# Patient Record
Sex: FEMALE | Race: OTHER | NOT HISPANIC OR LATINO
[De-identification: names, ages, dates, MRNs, and addresses within clinical notes are randomized per-mention and may not be internally consistent; named-entity substitution may affect disease eponyms.]

---

## 2017-01-27 ENCOUNTER — MEDICATION RENEWAL (OUTPATIENT)
Age: 27
End: 2017-01-27

## 2017-06-19 ENCOUNTER — RX RENEWAL (OUTPATIENT)
Age: 27
End: 2017-06-19

## 2017-08-15 ENCOUNTER — RX RENEWAL (OUTPATIENT)
Age: 27
End: 2017-08-15

## 2017-08-23 ENCOUNTER — MEDICATION RENEWAL (OUTPATIENT)
Age: 27
End: 2017-08-23

## 2017-09-18 ENCOUNTER — APPOINTMENT (OUTPATIENT)
Dept: NEUROLOGY | Facility: CLINIC | Age: 27
End: 2017-09-18
Payer: COMMERCIAL

## 2017-09-18 VITALS
DIASTOLIC BLOOD PRESSURE: 77 MMHG | SYSTOLIC BLOOD PRESSURE: 106 MMHG | BODY MASS INDEX: 24.77 KG/M2 | WEIGHT: 118 LBS | HEART RATE: 90 BPM | HEIGHT: 58 IN

## 2017-09-18 PROCEDURE — 99214 OFFICE O/P EST MOD 30 MIN: CPT

## 2017-11-15 ENCOUNTER — RX RENEWAL (OUTPATIENT)
Age: 27
End: 2017-11-15

## 2017-11-28 ENCOUNTER — RX RENEWAL (OUTPATIENT)
Age: 27
End: 2017-11-28

## 2018-01-23 ENCOUNTER — APPOINTMENT (OUTPATIENT)
Dept: NEUROLOGY | Facility: CLINIC | Age: 28
End: 2018-01-23

## 2018-05-09 ENCOUNTER — RX RENEWAL (OUTPATIENT)
Age: 28
End: 2018-05-09

## 2018-07-05 ENCOUNTER — RX RENEWAL (OUTPATIENT)
Age: 28
End: 2018-07-05

## 2018-08-07 ENCOUNTER — RX RENEWAL (OUTPATIENT)
Age: 28
End: 2018-08-07

## 2018-08-20 ENCOUNTER — RX RENEWAL (OUTPATIENT)
Age: 28
End: 2018-08-20

## 2018-09-05 ENCOUNTER — RX RENEWAL (OUTPATIENT)
Age: 28
End: 2018-09-05

## 2018-09-20 ENCOUNTER — RX RENEWAL (OUTPATIENT)
Age: 28
End: 2018-09-20

## 2018-10-25 ENCOUNTER — RX RENEWAL (OUTPATIENT)
Age: 28
End: 2018-10-25

## 2018-11-06 ENCOUNTER — APPOINTMENT (OUTPATIENT)
Dept: NEUROLOGY | Facility: CLINIC | Age: 28
End: 2018-11-06
Payer: COMMERCIAL

## 2018-11-06 VITALS
BODY MASS INDEX: 24.98 KG/M2 | WEIGHT: 119 LBS | DIASTOLIC BLOOD PRESSURE: 66 MMHG | SYSTOLIC BLOOD PRESSURE: 109 MMHG | HEIGHT: 58 IN | HEART RATE: 105 BPM

## 2018-11-06 PROCEDURE — 99213 OFFICE O/P EST LOW 20 MIN: CPT

## 2019-04-23 ENCOUNTER — RX RENEWAL (OUTPATIENT)
Age: 29
End: 2019-04-23

## 2019-05-28 ENCOUNTER — APPOINTMENT (OUTPATIENT)
Dept: NEUROLOGY | Facility: CLINIC | Age: 29
End: 2019-05-28
Payer: COMMERCIAL

## 2019-05-28 VITALS
SYSTOLIC BLOOD PRESSURE: 114 MMHG | DIASTOLIC BLOOD PRESSURE: 76 MMHG | WEIGHT: 119 LBS | HEART RATE: 96 BPM | BODY MASS INDEX: 24.98 KG/M2 | HEIGHT: 58 IN

## 2019-05-28 PROCEDURE — 99214 OFFICE O/P EST MOD 30 MIN: CPT

## 2019-05-28 NOTE — HISTORY OF PRESENT ILLNESS
[Left Handed] : left handed [Divalproex (Depakote)] : divalproex (Depakote) [Levetiracetam (Keppra)] : levetiracetam (Keppra) [Oxcarbazepine (Trileptal)] : oxcarbazepine (Trileptal) [Topiramate (Topamax)] : topiramate (Topamax) [FreeTextEntry1] : Mar 24, 1994  [FreeTextEntry2] : GTCs (initially had with fever) and myoclonus; EEG has shown generalized spike-wave discharges [de-identified] : 2004

## 2019-05-28 NOTE — LETTER CLOSING
[Consult] : Thank you very much for allowing me to participate in the care of this patient. If you have any questions, please do not hesitate to contact me [Sincerely,] : Sincerely, [Terry Hendrix MD] : Terry Hendrix MD [Attending Neurologist, Division of EEG and Epilepsy] : Attending Neurologist, Division of EEG and Epilepsy [[facility]] : [facility] [[facility]] : [facility]

## 2019-05-28 NOTE — PHYSICAL EXAM
[General Appearance - Alert] : alert [General Appearance - Well Nourished] : well nourished [General Appearance - Well Developed] : well developed [General Appearance - Well-Appearing] : healthy appearing [FreeTextEntry1] : Patient in happy state and walking around room [Cranial Nerves Optic (II)] : visual acuity intact bilaterally,  visual fields full to confrontation, pupils equal round and reactive to light [Cranial Nerves Oculomotor (III)] : extraocular motion intact [Cranial Nerves Facial (VII)] : face symmetrical [Balance] : balance was intact [2+] : Ankle jerk left 2+ [FreeTextEntry6] : Moves all four extremities spontaneously, unable to excess full strength.  Normal bulk and tone. [FreeTextEntry7] : Unable to assess [Sclera] : the sclera and conjunctiva were normal [PERRL With Normal Accommodation] : pupils were equal in size, round, reactive to light, with normal accommodation [Full Pulse] : the pedal pulses are present [Edema] : there was no peripheral edema [Abnormal Walk] : normal gait [Nail Clubbing] : no clubbing  or cyanosis of the fingernails [Musculoskeletal - Swelling] : no joint swelling seen [Motor Tone] : muscle strength and tone were normal

## 2019-05-28 NOTE — DISCUSSION/SUMMARY
[FreeTextEntry1] : Impression-\par - epilepsy - generalized (myoclonic)\par - severe mental retardation\par - h/o scoliosis\par \par Plan\par - continue syrup Keppra 750 mg bid\par - f/u 6 months and then one year\par  [Well-controlled] : well-controlled [Myoclonic] : myoclonic [Generalized] : generalized  [Idiopathic] : idiopathic

## 2019-12-12 ENCOUNTER — RX RENEWAL (OUTPATIENT)
Age: 29
End: 2019-12-12

## 2020-01-16 ENCOUNTER — RX RENEWAL (OUTPATIENT)
Age: 30
End: 2020-01-16

## 2020-02-14 ENCOUNTER — APPOINTMENT (OUTPATIENT)
Dept: NEUROLOGY | Facility: CLINIC | Age: 30
End: 2020-02-14
Payer: COMMERCIAL

## 2020-02-14 VITALS
HEART RATE: 89 BPM | SYSTOLIC BLOOD PRESSURE: 122 MMHG | WEIGHT: 119 LBS | DIASTOLIC BLOOD PRESSURE: 87 MMHG | HEIGHT: 58 IN | BODY MASS INDEX: 24.98 KG/M2

## 2020-02-14 PROCEDURE — 99214 OFFICE O/P EST MOD 30 MIN: CPT

## 2020-02-14 NOTE — HISTORY OF PRESENT ILLNESS
[Left Handed] : left handed [Divalproex (Depakote)] : divalproex (Depakote) [Levetiracetam (Keppra)] : levetiracetam (Keppra) [Oxcarbazepine (Trileptal)] : oxcarbazepine (Trileptal) [Topiramate (Topamax)] : topiramate (Topamax) [FreeTextEntry1] : Mar 24, 1994  [FreeTextEntry2] : GTCs (initially had with fever) and myoclonus; EEG has shown generalized spike-wave discharges [de-identified] : 2004

## 2020-02-14 NOTE — LETTER CLOSING
[Consult] : Thank you very much for allowing me to participate in the care of this patient. If you have any questions, please do not hesitate to contact me [Terry Hendrix MD] : Terry Hendrix MD [Attending Neurologist, Division of EEG and Epilepsy] : Attending Neurologist, Division of EEG and Epilepsy [Sincerely,] : Sincerely, [[facility]] : [facility] [[facility]] : [facility]

## 2020-02-14 NOTE — PHYSICAL EXAM
[General Appearance - Alert] : alert [General Appearance - Well Nourished] : well nourished [General Appearance - Well Developed] : well developed [General Appearance - Well-Appearing] : healthy appearing [Cranial Nerves Oculomotor (III)] : extraocular motion intact [Cranial Nerves Optic (II)] : visual acuity intact bilaterally,  visual fields full to confrontation, pupils equal round and reactive to light [Cranial Nerves Facial (VII)] : face symmetrical [Balance] : balance was intact [2+] : Ankle jerk left 2+ [PERRL With Normal Accommodation] : pupils were equal in size, round, reactive to light, with normal accommodation [Sclera] : the sclera and conjunctiva were normal [Edema] : there was no peripheral edema [Full Pulse] : the pedal pulses are present [Abnormal Walk] : normal gait [Musculoskeletal - Swelling] : no joint swelling seen [Nail Clubbing] : no clubbing  or cyanosis of the fingernails [Motor Tone] : muscle strength and tone were normal [FreeTextEntry1] : Patient in happy state and walking around room [FreeTextEntry7] : Unable to assess [FreeTextEntry6] : Moves all four extremities spontaneously, unable to excess full strength.  Normal bulk and tone.

## 2020-02-14 NOTE — DISCUSSION/SUMMARY
[Well-controlled] : well-controlled [Myoclonic] : myoclonic [Idiopathic] : idiopathic  [Generalized] : generalized  [FreeTextEntry1] : Impression-\par - epilepsy - generalized (myoclonic)\par - severe mental retardation\par - h/o scoliosis\par \par Plan\par - continue syrup Keppra 750 mg bid\par - f/u 6 months and then one year\par \par Patient seen 25 min face to face with >50% in counseling and discussion.

## 2021-02-02 ENCOUNTER — APPOINTMENT (OUTPATIENT)
Dept: NEUROLOGY | Facility: CLINIC | Age: 31
End: 2021-02-02
Payer: COMMERCIAL

## 2021-02-02 VITALS
DIASTOLIC BLOOD PRESSURE: 81 MMHG | BODY MASS INDEX: 24.98 KG/M2 | HEIGHT: 58 IN | HEART RATE: 83 BPM | SYSTOLIC BLOOD PRESSURE: 116 MMHG | WEIGHT: 119 LBS

## 2021-02-02 PROCEDURE — 99072 ADDL SUPL MATRL&STAF TM PHE: CPT

## 2021-02-02 PROCEDURE — 99213 OFFICE O/P EST LOW 20 MIN: CPT

## 2021-02-02 NOTE — HISTORY OF PRESENT ILLNESS
[Left Handed] : left handed [Divalproex (Depakote)] : divalproex (Depakote) [Levetiracetam (Keppra)] : levetiracetam (Keppra) [Oxcarbazepine (Trileptal)] : oxcarbazepine (Trileptal) [Topiramate (Topamax)] : topiramate (Topamax) [FreeTextEntry1] : Mar 24, 1994  [FreeTextEntry2] : GTCs (initially had with fever) and myoclonus; EEG has shown generalized spike-wave discharges [de-identified] : 2004

## 2021-02-02 NOTE — DISCUSSION/SUMMARY
[FreeTextEntry1] : Impression-\par - epilepsy - generalized (myoclonic)\par - severe mental retardation\par - h/o scoliosis\par \par Plan\par - continue syrup Keppra 750 mg bid\par - f/u 6 months and then one year\par \par Total time 22 minutes. [Well-controlled] : well-controlled [Myoclonic] : myoclonic [Generalized] : generalized  [Idiopathic] : idiopathic

## 2021-08-20 ENCOUNTER — RX RENEWAL (OUTPATIENT)
Age: 31
End: 2021-08-20

## 2021-10-13 ENCOUNTER — RX RENEWAL (OUTPATIENT)
Age: 31
End: 2021-10-13

## 2022-02-07 ENCOUNTER — NON-APPOINTMENT (OUTPATIENT)
Age: 32
End: 2022-02-07

## 2022-02-08 ENCOUNTER — APPOINTMENT (OUTPATIENT)
Dept: NEUROLOGY | Facility: CLINIC | Age: 32
End: 2022-02-08
Payer: COMMERCIAL

## 2022-02-08 PROCEDURE — 99213 OFFICE O/P EST LOW 20 MIN: CPT

## 2022-02-13 NOTE — DISCUSSION/SUMMARY
[Well-controlled] : well-controlled [Myoclonic] : myoclonic [Generalized] : generalized  [Idiopathic] : idiopathic  [FreeTextEntry1] : Impression-\par - epilepsy - generalized (myoclonic)\par - severe mental retardation\par - h/o scoliosis\par \par Plan\par - continue  Keppra sol'n 750 mg bid\par - annual lab work\par - follow up in 6  months  call if any seizures\par \par Total time 22 minutes.

## 2022-02-13 NOTE — PHYSICAL EXAM
[General Appearance - Alert] : alert [General Appearance - Well Nourished] : well nourished [General Appearance - Well Developed] : well developed [General Appearance - Well-Appearing] : healthy appearing [Cranial Nerves Optic (II)] : visual acuity intact bilaterally,  visual fields full to confrontation, pupils equal round and reactive to light [Cranial Nerves Oculomotor (III)] : extraocular motion intact [Cranial Nerves Facial (VII)] : face symmetrical [Balance] : balance was intact [2+] : Ankle jerk left 2+ [Sclera] : the sclera and conjunctiva were normal [PERRL With Normal Accommodation] : pupils were equal in size, round, reactive to light, with normal accommodation [Full Pulse] : the pedal pulses are present [Edema] : there was no peripheral edema [Abnormal Walk] : normal gait [Nail Clubbing] : no clubbing  or cyanosis of the fingernails [Musculoskeletal - Swelling] : no joint swelling seen [Motor Tone] : muscle strength and tone were normal [FreeTextEntry1] : Patient in happy state and walking around room [FreeTextEntry6] : Moves all four extremities spontaneously, unable to excess full strength.  Normal bulk and tone. [FreeTextEntry7] : Unable to assess

## 2022-11-05 ENCOUNTER — INPATIENT (INPATIENT)
Facility: HOSPITAL | Age: 32
LOS: 0 days | Discharge: HOME HEALTH | DRG: 179 | End: 2022-11-06
Attending: STUDENT IN AN ORGANIZED HEALTH CARE EDUCATION/TRAINING PROGRAM | Admitting: STUDENT IN AN ORGANIZED HEALTH CARE EDUCATION/TRAINING PROGRAM
Payer: COMMERCIAL

## 2022-11-05 VITALS
RESPIRATION RATE: 24 BRPM | OXYGEN SATURATION: 98 % | HEIGHT: 59 IN | SYSTOLIC BLOOD PRESSURE: 160 MMHG | DIASTOLIC BLOOD PRESSURE: 107 MMHG | WEIGHT: 117.07 LBS | HEART RATE: 69 BPM

## 2022-11-05 LAB
ALBUMIN SERPL ELPH-MCNC: 4.4 G/DL — SIGNIFICANT CHANGE UP (ref 3.3–5)
ALP SERPL-CCNC: 77 U/L — SIGNIFICANT CHANGE UP (ref 40–120)
ALT FLD-CCNC: 31 U/L — SIGNIFICANT CHANGE UP (ref 10–45)
ANION GAP SERPL CALC-SCNC: 16 MMOL/L — SIGNIFICANT CHANGE UP (ref 5–17)
AST SERPL-CCNC: 42 U/L — HIGH (ref 10–40)
B PERT DNA SPEC QL NAA+PROBE: SIGNIFICANT CHANGE UP
BASE EXCESS BLDV CALC-SCNC: 1.2 MMOL/L — SIGNIFICANT CHANGE UP (ref -2–3)
BASOPHILS # BLD AUTO: 0.01 K/UL — SIGNIFICANT CHANGE UP (ref 0–0.2)
BASOPHILS NFR BLD AUTO: 0.1 % — SIGNIFICANT CHANGE UP (ref 0–2)
BILIRUB SERPL-MCNC: 0.3 MG/DL — SIGNIFICANT CHANGE UP (ref 0.2–1.2)
BUN SERPL-MCNC: 14 MG/DL — SIGNIFICANT CHANGE UP (ref 7–23)
C PNEUM DNA SPEC QL NAA+PROBE: SIGNIFICANT CHANGE UP
CA-I SERPL-SCNC: 1.17 MMOL/L — SIGNIFICANT CHANGE UP (ref 1.15–1.33)
CALCIUM SERPL-MCNC: 9.5 MG/DL — SIGNIFICANT CHANGE UP (ref 8.4–10.5)
CHLORIDE BLDV-SCNC: 103 MMOL/L — SIGNIFICANT CHANGE UP (ref 96–108)
CHLORIDE SERPL-SCNC: 102 MMOL/L — SIGNIFICANT CHANGE UP (ref 96–108)
CO2 BLDV-SCNC: 29 MMOL/L — HIGH (ref 22–26)
CO2 SERPL-SCNC: 21 MMOL/L — LOW (ref 22–31)
CREAT SERPL-MCNC: 0.4 MG/DL — LOW (ref 0.5–1.3)
EGFR: 134 ML/MIN/1.73M2 — SIGNIFICANT CHANGE UP
EOSINOPHIL # BLD AUTO: 0 K/UL — SIGNIFICANT CHANGE UP (ref 0–0.5)
EOSINOPHIL NFR BLD AUTO: 0 % — SIGNIFICANT CHANGE UP (ref 0–6)
FLUAV H1 2009 PAND RNA SPEC QL NAA+PROBE: SIGNIFICANT CHANGE UP
FLUAV H1 RNA SPEC QL NAA+PROBE: SIGNIFICANT CHANGE UP
FLUAV H3 RNA SPEC QL NAA+PROBE: SIGNIFICANT CHANGE UP
FLUAV SUBTYP SPEC NAA+PROBE: SIGNIFICANT CHANGE UP
FLUBV RNA SPEC QL NAA+PROBE: SIGNIFICANT CHANGE UP
GAS PNL BLDV: 136 MMOL/L — SIGNIFICANT CHANGE UP (ref 136–145)
GAS PNL BLDV: SIGNIFICANT CHANGE UP
GAS PNL BLDV: SIGNIFICANT CHANGE UP
GLUCOSE BLDV-MCNC: 96 MG/DL — SIGNIFICANT CHANGE UP (ref 70–99)
GLUCOSE SERPL-MCNC: 99 MG/DL — SIGNIFICANT CHANGE UP (ref 70–99)
HADV DNA SPEC QL NAA+PROBE: SIGNIFICANT CHANGE UP
HCG SERPL-ACNC: <2 MIU/ML — SIGNIFICANT CHANGE UP
HCO3 BLDV-SCNC: 27 MMOL/L — SIGNIFICANT CHANGE UP (ref 22–29)
HCOV PNL SPEC NAA+PROBE: SIGNIFICANT CHANGE UP
HCT VFR BLD CALC: 44.2 % — SIGNIFICANT CHANGE UP (ref 34.5–45)
HCT VFR BLDA CALC: 45 % — SIGNIFICANT CHANGE UP (ref 34.5–46.5)
HGB BLD CALC-MCNC: 14.9 G/DL — SIGNIFICANT CHANGE UP (ref 11.7–16.1)
HGB BLD-MCNC: 14.5 G/DL — SIGNIFICANT CHANGE UP (ref 11.5–15.5)
HMPV RNA SPEC QL NAA+PROBE: SIGNIFICANT CHANGE UP
HPIV1 RNA SPEC QL NAA+PROBE: SIGNIFICANT CHANGE UP
HPIV2 RNA SPEC QL NAA+PROBE: SIGNIFICANT CHANGE UP
HPIV3 RNA SPEC QL NAA+PROBE: SIGNIFICANT CHANGE UP
HPIV4 RNA SPEC QL NAA+PROBE: SIGNIFICANT CHANGE UP
IMM GRANULOCYTES NFR BLD AUTO: 0.4 % — SIGNIFICANT CHANGE UP (ref 0–0.9)
LACTATE BLDV-MCNC: 2.4 MMOL/L — HIGH (ref 0.5–2)
LIDOCAIN IGE QN: 23 U/L — SIGNIFICANT CHANGE UP (ref 7–60)
LYMPHOCYTES # BLD AUTO: 1.14 K/UL — SIGNIFICANT CHANGE UP (ref 1–3.3)
LYMPHOCYTES # BLD AUTO: 15.4 % — SIGNIFICANT CHANGE UP (ref 13–44)
MAGNESIUM SERPL-MCNC: 2.3 MG/DL — SIGNIFICANT CHANGE UP (ref 1.6–2.6)
MCHC RBC-ENTMCNC: 30.1 PG — SIGNIFICANT CHANGE UP (ref 27–34)
MCHC RBC-ENTMCNC: 32.8 GM/DL — SIGNIFICANT CHANGE UP (ref 32–36)
MCV RBC AUTO: 91.7 FL — SIGNIFICANT CHANGE UP (ref 80–100)
MONOCYTES # BLD AUTO: 0.51 K/UL — SIGNIFICANT CHANGE UP (ref 0–0.9)
MONOCYTES NFR BLD AUTO: 6.9 % — SIGNIFICANT CHANGE UP (ref 2–14)
NEUTROPHILS # BLD AUTO: 5.7 K/UL — SIGNIFICANT CHANGE UP (ref 1.8–7.4)
NEUTROPHILS NFR BLD AUTO: 77.2 % — HIGH (ref 43–77)
NRBC # BLD: 0 /100 WBCS — SIGNIFICANT CHANGE UP (ref 0–0)
PCO2 BLDV: 47 MMHG — HIGH (ref 39–42)
PH BLDV: 7.37 — SIGNIFICANT CHANGE UP (ref 7.32–7.43)
PHOSPHATE SERPL-MCNC: 4.4 MG/DL — SIGNIFICANT CHANGE UP (ref 2.5–4.5)
PLATELET # BLD AUTO: 211 K/UL — SIGNIFICANT CHANGE UP (ref 150–400)
PO2 BLDV: 36 MMHG — SIGNIFICANT CHANGE UP (ref 25–45)
POTASSIUM BLDV-SCNC: 4.5 MMOL/L — SIGNIFICANT CHANGE UP (ref 3.5–5.1)
POTASSIUM SERPL-MCNC: 5.3 MMOL/L — SIGNIFICANT CHANGE UP (ref 3.5–5.3)
POTASSIUM SERPL-SCNC: 5.3 MMOL/L — SIGNIFICANT CHANGE UP (ref 3.5–5.3)
PROT SERPL-MCNC: 8.8 G/DL — HIGH (ref 6–8.3)
RAPID RVP RESULT: DETECTED
RBC # BLD: 4.82 M/UL — SIGNIFICANT CHANGE UP (ref 3.8–5.2)
RBC # FLD: 12.3 % — SIGNIFICANT CHANGE UP (ref 10.3–14.5)
RV+EV RNA SPEC QL NAA+PROBE: SIGNIFICANT CHANGE UP
SAO2 % BLDV: 57 % — LOW (ref 67–88)
SARS-COV-2 RNA SPEC QL NAA+PROBE: DETECTED
SODIUM SERPL-SCNC: 139 MMOL/L — SIGNIFICANT CHANGE UP (ref 135–145)
WBC # BLD: 7.39 K/UL — SIGNIFICANT CHANGE UP (ref 3.8–10.5)
WBC # FLD AUTO: 7.39 K/UL — SIGNIFICANT CHANGE UP (ref 3.8–10.5)

## 2022-11-05 PROCEDURE — 99285 EMERGENCY DEPT VISIT HI MDM: CPT | Mod: GC

## 2022-11-05 PROCEDURE — 74177 CT ABD & PELVIS W/CONTRAST: CPT | Mod: 26,MA

## 2022-11-05 PROCEDURE — 71045 X-RAY EXAM CHEST 1 VIEW: CPT | Mod: 26

## 2022-11-05 PROCEDURE — 93010 ELECTROCARDIOGRAM REPORT: CPT

## 2022-11-05 RX ORDER — ONDANSETRON 8 MG/1
4 TABLET, FILM COATED ORAL ONCE
Refills: 0 | Status: COMPLETED | OUTPATIENT
Start: 2022-11-05 | End: 2022-11-05

## 2022-11-05 RX ORDER — SODIUM CHLORIDE 9 MG/ML
1000 INJECTION INTRAMUSCULAR; INTRAVENOUS; SUBCUTANEOUS ONCE
Refills: 0 | Status: DISCONTINUED | OUTPATIENT
Start: 2022-11-05 | End: 2022-11-05

## 2022-11-05 RX ORDER — METOCLOPRAMIDE HCL 10 MG
10 TABLET ORAL ONCE
Refills: 0 | Status: COMPLETED | OUTPATIENT
Start: 2022-11-05 | End: 2022-11-05

## 2022-11-05 RX ORDER — SODIUM CHLORIDE 9 MG/ML
1000 INJECTION, SOLUTION INTRAVENOUS
Refills: 0 | Status: DISCONTINUED | OUTPATIENT
Start: 2022-11-05 | End: 2022-11-06

## 2022-11-05 RX ORDER — SODIUM CHLORIDE 9 MG/ML
1000 INJECTION, SOLUTION INTRAVENOUS ONCE
Refills: 0 | Status: COMPLETED | OUTPATIENT
Start: 2022-11-05 | End: 2022-11-05

## 2022-11-05 RX ADMIN — ONDANSETRON 4 MILLIGRAM(S): 8 TABLET, FILM COATED ORAL at 19:12

## 2022-11-05 RX ADMIN — SODIUM CHLORIDE 125 MILLILITER(S): 9 INJECTION, SOLUTION INTRAVENOUS at 23:52

## 2022-11-05 RX ADMIN — Medication 10 MILLIGRAM(S): at 23:52

## 2022-11-05 RX ADMIN — SODIUM CHLORIDE 1000 MILLILITER(S): 9 INJECTION, SOLUTION INTRAVENOUS at 19:12

## 2022-11-05 NOTE — ED ADULT TRIAGE NOTE - CHIEF COMPLAINT QUOTE
cough for few days, started on Cefdinir yesterday, abdominal pain and vomiting this morning cough for few days, started on Cefdinir yesterday, abdominal pain and vomiting this morning, Covid positive 5 days ago

## 2022-11-05 NOTE — ED PROVIDER NOTE - NSICDXFAMILYHX_GEN_ALL_CORE_FT
FAMILY HISTORY:  Father  Still living? Yes, Estimated age: Age Unknown  Family history of diabetes mellitus, Age at diagnosis: Age Unknown  Family history of hypertension, Age at diagnosis: Age Unknown    Mother  Still living? Yes, Estimated age: Age Unknown  Family history of asthma, Age at diagnosis: Age Unknown

## 2022-11-05 NOTE — ED ADULT NURSE NOTE - CHIEF COMPLAINT QUOTE
cough for few days, started on Cefdinir yesterday, abdominal pain and vomiting this morning, Covid positive 5 days ago

## 2022-11-05 NOTE — ED PROVIDER NOTE - CLINICAL SUMMARY MEDICAL DECISION MAKING FREE TEXT BOX
33 y/o f with pmhx sig for MR, developmental delays, nonverbal at baseline, seizure disorder who pted with 2 days of nbnb emesis. + covid 5 days ago. uri sx have improved. given abx 2 days ago by PMD. Sukumar in ED. Exam as above. Per mom, patient presented in similar manner when she had a uti in past. Will get cbc, cmp, mag, phos, vbg, lipase, hcg, ua/cx, cxr, rvp, CT A/P. Zofran and fluids. dispo pending labs and imaging.

## 2022-11-05 NOTE — ED PROVIDER NOTE - NS ED ROS FT
*****obtained from mom as patient is nonverbal*****    CONSTITUTIONAL: see hpi   EYES: no visual changes, no eye pain  EARS: no ear drainage, no ear pain, no change in hearing  NOSE: + nasal congestion  MOUTH/THROAT: no sore throat  CV: No chest pain, no palpitations  RESP: No SOB, + cough  GI: see hpi   : no dysuria, no hematuria, no flank pain  MSK: no back pain, no extremity pain  SKIN: no rashes  NEURO: no headache, no focal weakness, no decreased sensation/parasthesias

## 2022-11-05 NOTE — ED PROVIDER NOTE - ATTENDING CONTRIBUTION TO CARE
Attending MD Wang:  I personally have seen and examined this patient. I have performed a substantive portion of the visit including all aspects of the medical decision making.  Resident note reviewed and agree on plan of care and except where noted.      32-year-old woman with a history of autism and developmental delay presenting with mother for evaluation of nausea vomiting.  Reportedly COVID-positive from home test.  Patient is on cefdinir empirically as patient has had episodes of urinary infections in the past without obvious direct indicators.    Patient in the room vomiting clear emesis multiple times.  No apparent distress otherwise.  Awake and alert and nontoxic-appearing.  Abdomen soft, nondistended and nontender.    Differential diagnosis includes nausea and vomiting related to antibiotic use, occult urinary infection or related to COVID-19.  Given patient cannot contribute to history and difficult to assess given developmental delay, will obtain CT abdomen pelvis to rule out occult intra-abdominal pathology.  Will provide IV fluids and antiemetics.        *The above represents an initial assessment/impression. Please refer to progress notes for potential changes in patient clinical course*

## 2022-11-05 NOTE — ED PROVIDER NOTE - PHYSICAL EXAMINATION
General: Alert and Orientated x 0 as nonverbal, responsive, multiple ep of emesis in room.   Head: Normocephalic and atraumatic.  Eyes: PERRLA with EOMI.  Neck: Supple. Trachea midline.   Cardiac: Normal S1 and S2 w/ RRR. No murmurs appreciated.   Pulmonary: CTA bilaterally. No increased WOB. No wheezes or crackles.  Abdominal: Soft, non-tender. (+) bowel sounds appreciated in all 4 quadrants. No hepatosplenomegaly.   Neurologic: No focal sensory or motor deficits.  Musculoskeletal: Strength appropriate in all 4 extremities for age with no limited ROM.  Skin: Color appropriate for race. Intact, warm, and well-perfused.  Psychiatric: nonverbal

## 2022-11-05 NOTE — ED ADULT NURSE NOTE - OBJECTIVE STATEMENT
32 y female presents from home with n/v x 2 days.  hx of MR; nonverbal. mom reports to positive covid test 5 days ago; patient began vomiting 2 days ago following, "starting antibiotic." upon arrival patient with clear emesis. abdomen soft nontender. breathing comfortably in bed- no distress. 20G placed in R hand. patient awaiting labs radiology and dispo.

## 2022-11-05 NOTE — ED PROVIDER NOTE - OBJECTIVE STATEMENT
Patient is a 33 y/o F with PMHX sig for MR, seizure disorder (stable), developmental delays, non-verbal at baseline who pted with 2 days of multipl eep of nbnb emesis. 5 days ago rapid at home covid test positive. Had tmax of 101F that day which is what promoted rapid test. Has had nasal congestion, productive cough which have subsided. Mom called PMD to ask for abx and was prescribed cefdinir which patient tolerated in past. After 2nd rashid, ep of emesis began. Per mom, patient has not suggested abd pain, urinary or bowel changes. No hx of abd sx. No sick contacts or travel. LMP 2 wks ago.

## 2022-11-06 ENCOUNTER — TRANSCRIPTION ENCOUNTER (OUTPATIENT)
Age: 32
End: 2022-11-06

## 2022-11-06 VITALS
HEART RATE: 90 BPM | OXYGEN SATURATION: 98 % | RESPIRATION RATE: 18 BRPM | DIASTOLIC BLOOD PRESSURE: 63 MMHG | TEMPERATURE: 98 F | SYSTOLIC BLOOD PRESSURE: 96 MMHG

## 2022-11-06 DIAGNOSIS — R11.2 NAUSEA WITH VOMITING, UNSPECIFIED: ICD-10-CM

## 2022-11-06 DIAGNOSIS — U07.1 COVID-19: ICD-10-CM

## 2022-11-06 DIAGNOSIS — R09.89 OTHER SPECIFIED SYMPTOMS AND SIGNS INVOLVING THE CIRCULATORY AND RESPIRATORY SYSTEMS: ICD-10-CM

## 2022-11-06 DIAGNOSIS — Z29.9 ENCOUNTER FOR PROPHYLACTIC MEASURES, UNSPECIFIED: ICD-10-CM

## 2022-11-06 DIAGNOSIS — R56.9 UNSPECIFIED CONVULSIONS: ICD-10-CM

## 2022-11-06 DIAGNOSIS — Z87.898 PERSONAL HISTORY OF OTHER SPECIFIED CONDITIONS: ICD-10-CM

## 2022-11-06 LAB
ALBUMIN SERPL ELPH-MCNC: 3.7 G/DL — SIGNIFICANT CHANGE UP (ref 3.3–5)
ALP SERPL-CCNC: 65 U/L — SIGNIFICANT CHANGE UP (ref 40–120)
ALT FLD-CCNC: 27 U/L — SIGNIFICANT CHANGE UP (ref 10–45)
ANION GAP SERPL CALC-SCNC: 11 MMOL/L — SIGNIFICANT CHANGE UP (ref 5–17)
APPEARANCE UR: CLEAR — SIGNIFICANT CHANGE UP
APTT BLD: 30.9 SEC — SIGNIFICANT CHANGE UP (ref 27.5–35.5)
AST SERPL-CCNC: 29 U/L — SIGNIFICANT CHANGE UP (ref 10–40)
BACTERIA # UR AUTO: NEGATIVE — SIGNIFICANT CHANGE UP
BILIRUB SERPL-MCNC: 0.4 MG/DL — SIGNIFICANT CHANGE UP (ref 0.2–1.2)
BILIRUB UR-MCNC: NEGATIVE — SIGNIFICANT CHANGE UP
BUN SERPL-MCNC: 11 MG/DL — SIGNIFICANT CHANGE UP (ref 7–23)
CALCIUM SERPL-MCNC: 9 MG/DL — SIGNIFICANT CHANGE UP (ref 8.4–10.5)
CHLORIDE SERPL-SCNC: 104 MMOL/L — SIGNIFICANT CHANGE UP (ref 96–108)
CO2 SERPL-SCNC: 23 MMOL/L — SIGNIFICANT CHANGE UP (ref 22–31)
COLOR SPEC: SIGNIFICANT CHANGE UP
CREAT SERPL-MCNC: 0.45 MG/DL — LOW (ref 0.5–1.3)
D DIMER BLD IA.RAPID-MCNC: <150 NG/ML DDU — SIGNIFICANT CHANGE UP
DIFF PNL FLD: NEGATIVE — SIGNIFICANT CHANGE UP
EGFR: 130 ML/MIN/1.73M2 — SIGNIFICANT CHANGE UP
EPI CELLS # UR: 2 /HPF — SIGNIFICANT CHANGE UP
GLUCOSE SERPL-MCNC: 84 MG/DL — SIGNIFICANT CHANGE UP (ref 70–99)
GLUCOSE UR QL: NEGATIVE — SIGNIFICANT CHANGE UP
HCG UR QL: NEGATIVE — SIGNIFICANT CHANGE UP
HCT VFR BLD CALC: 40.2 % — SIGNIFICANT CHANGE UP (ref 34.5–45)
HGB BLD-MCNC: 13 G/DL — SIGNIFICANT CHANGE UP (ref 11.5–15.5)
HYALINE CASTS # UR AUTO: 0 /LPF — SIGNIFICANT CHANGE UP (ref 0–2)
INR BLD: 1.12 RATIO — SIGNIFICANT CHANGE UP (ref 0.88–1.16)
KETONES UR-MCNC: ABNORMAL
LEUKOCYTE ESTERASE UR-ACNC: NEGATIVE — SIGNIFICANT CHANGE UP
MAGNESIUM SERPL-MCNC: 2.1 MG/DL — SIGNIFICANT CHANGE UP (ref 1.6–2.6)
MCHC RBC-ENTMCNC: 30.6 PG — SIGNIFICANT CHANGE UP (ref 27–34)
MCHC RBC-ENTMCNC: 32.3 GM/DL — SIGNIFICANT CHANGE UP (ref 32–36)
MCV RBC AUTO: 94.6 FL — SIGNIFICANT CHANGE UP (ref 80–100)
NITRITE UR-MCNC: NEGATIVE — SIGNIFICANT CHANGE UP
NRBC # BLD: 0 /100 WBCS — SIGNIFICANT CHANGE UP (ref 0–0)
PH UR: 9 — HIGH (ref 5–8)
PLATELET # BLD AUTO: 224 K/UL — SIGNIFICANT CHANGE UP (ref 150–400)
POTASSIUM SERPL-MCNC: 4 MMOL/L — SIGNIFICANT CHANGE UP (ref 3.5–5.3)
POTASSIUM SERPL-SCNC: 4 MMOL/L — SIGNIFICANT CHANGE UP (ref 3.5–5.3)
PROT SERPL-MCNC: 7.1 G/DL — SIGNIFICANT CHANGE UP (ref 6–8.3)
PROT UR-MCNC: ABNORMAL
PROTHROM AB SERPL-ACNC: 13 SEC — SIGNIFICANT CHANGE UP (ref 10.5–13.4)
RBC # BLD: 4.25 M/UL — SIGNIFICANT CHANGE UP (ref 3.8–5.2)
RBC # FLD: 12.1 % — SIGNIFICANT CHANGE UP (ref 10.3–14.5)
RBC CASTS # UR COMP ASSIST: 1 /HPF — SIGNIFICANT CHANGE UP (ref 0–4)
SODIUM SERPL-SCNC: 138 MMOL/L — SIGNIFICANT CHANGE UP (ref 135–145)
SP GR SPEC: >1.05 (ref 1.01–1.02)
UROBILINOGEN FLD QL: NEGATIVE — SIGNIFICANT CHANGE UP
WBC # BLD: 8.48 K/UL — SIGNIFICANT CHANGE UP (ref 3.8–10.5)
WBC # FLD AUTO: 8.48 K/UL — SIGNIFICANT CHANGE UP (ref 3.8–10.5)
WBC UR QL: 1 /HPF — SIGNIFICANT CHANGE UP (ref 0–5)

## 2022-11-06 PROCEDURE — 99199 UNLISTED SPECIAL SVC PX/RPRT: CPT

## 2022-11-06 PROCEDURE — 99223 1ST HOSP IP/OBS HIGH 75: CPT

## 2022-11-06 PROCEDURE — 83605 ASSAY OF LACTIC ACID: CPT

## 2022-11-06 PROCEDURE — 85018 HEMOGLOBIN: CPT

## 2022-11-06 PROCEDURE — 85014 HEMATOCRIT: CPT

## 2022-11-06 PROCEDURE — 82435 ASSAY OF BLOOD CHLORIDE: CPT

## 2022-11-06 PROCEDURE — 80053 COMPREHEN METABOLIC PANEL: CPT

## 2022-11-06 PROCEDURE — 85025 COMPLETE CBC W/AUTO DIFF WBC: CPT

## 2022-11-06 PROCEDURE — 71045 X-RAY EXAM CHEST 1 VIEW: CPT

## 2022-11-06 PROCEDURE — 81001 URINALYSIS AUTO W/SCOPE: CPT

## 2022-11-06 PROCEDURE — 96375 TX/PRO/DX INJ NEW DRUG ADDON: CPT

## 2022-11-06 PROCEDURE — 85379 FIBRIN DEGRADATION QUANT: CPT

## 2022-11-06 PROCEDURE — 85610 PROTHROMBIN TIME: CPT

## 2022-11-06 PROCEDURE — 83735 ASSAY OF MAGNESIUM: CPT

## 2022-11-06 PROCEDURE — 82803 BLOOD GASES ANY COMBINATION: CPT

## 2022-11-06 PROCEDURE — 74177 CT ABD & PELVIS W/CONTRAST: CPT | Mod: MA

## 2022-11-06 PROCEDURE — 84100 ASSAY OF PHOSPHORUS: CPT

## 2022-11-06 PROCEDURE — 96374 THER/PROPH/DIAG INJ IV PUSH: CPT

## 2022-11-06 PROCEDURE — 0225U NFCT DS DNA&RNA 21 SARSCOV2: CPT

## 2022-11-06 PROCEDURE — 84702 CHORIONIC GONADOTROPIN TEST: CPT

## 2022-11-06 PROCEDURE — 82330 ASSAY OF CALCIUM: CPT

## 2022-11-06 PROCEDURE — 84132 ASSAY OF SERUM POTASSIUM: CPT

## 2022-11-06 PROCEDURE — 99285 EMERGENCY DEPT VISIT HI MDM: CPT | Mod: 25

## 2022-11-06 PROCEDURE — 82947 ASSAY GLUCOSE BLOOD QUANT: CPT

## 2022-11-06 PROCEDURE — 84295 ASSAY OF SERUM SODIUM: CPT

## 2022-11-06 PROCEDURE — 81025 URINE PREGNANCY TEST: CPT

## 2022-11-06 PROCEDURE — 36415 COLL VENOUS BLD VENIPUNCTURE: CPT

## 2022-11-06 PROCEDURE — 83690 ASSAY OF LIPASE: CPT

## 2022-11-06 PROCEDURE — 85730 THROMBOPLASTIN TIME PARTIAL: CPT

## 2022-11-06 RX ORDER — LANOLIN ALCOHOL/MO/W.PET/CERES
3 CREAM (GRAM) TOPICAL AT BEDTIME
Refills: 0 | Status: DISCONTINUED | OUTPATIENT
Start: 2022-11-06 | End: 2022-11-06

## 2022-11-06 RX ORDER — LEVETIRACETAM 250 MG/1
750 TABLET, FILM COATED ORAL
Refills: 0 | Status: DISCONTINUED | OUTPATIENT
Start: 2022-11-06 | End: 2022-11-06

## 2022-11-06 RX ORDER — ONDANSETRON 8 MG/1
4 TABLET, FILM COATED ORAL EVERY 8 HOURS
Refills: 0 | Status: DISCONTINUED | OUTPATIENT
Start: 2022-11-06 | End: 2022-11-06

## 2022-11-06 RX ORDER — PANTOPRAZOLE SODIUM 20 MG/1
40 TABLET, DELAYED RELEASE ORAL DAILY
Refills: 0 | Status: DISCONTINUED | OUTPATIENT
Start: 2022-11-06 | End: 2022-11-06

## 2022-11-06 RX ORDER — ONDANSETRON 8 MG/1
1 TABLET, FILM COATED ORAL
Qty: 6 | Refills: 0
Start: 2022-11-06 | End: 2022-11-07

## 2022-11-06 RX ORDER — CHLORHEXIDINE GLUCONATE 213 G/1000ML
1 SOLUTION TOPICAL DAILY
Refills: 0 | Status: DISCONTINUED | OUTPATIENT
Start: 2022-11-06 | End: 2022-11-06

## 2022-11-06 RX ORDER — REMDESIVIR 5 MG/ML
INJECTION INTRAVENOUS
Refills: 0 | Status: DISCONTINUED | OUTPATIENT
Start: 2022-11-06 | End: 2022-11-06

## 2022-11-06 RX ORDER — ACETAMINOPHEN 500 MG
650 TABLET ORAL EVERY 6 HOURS
Refills: 0 | Status: DISCONTINUED | OUTPATIENT
Start: 2022-11-06 | End: 2022-11-06

## 2022-11-06 RX ADMIN — LEVETIRACETAM 750 MILLIGRAM(S): 250 TABLET, FILM COATED ORAL at 06:17

## 2022-11-06 RX ADMIN — SODIUM CHLORIDE 125 MILLILITER(S): 9 INJECTION, SOLUTION INTRAVENOUS at 10:53

## 2022-11-06 NOTE — H&P ADULT - NSHPLABSRESULTS_GEN_ALL_CORE
I have reviewed the labs, imaging and ekg. EKG with Sinus tachycardia , non-specific ST segment findings. CXR w/o focal consolidations

## 2022-11-06 NOTE — DISCHARGE NOTE NURSING/CASE MANAGEMENT/SOCIAL WORK - PATIENT PORTAL LINK FT
You can access the FollowMyHealth Patient Portal offered by [facility] by registering at the following website: [email or web address]. By joining [facility]’s FollowMyHealth portal, you will also be able to view your health information using other applications (apps) compatible with our system.

## 2022-11-06 NOTE — DISCHARGE NOTE PROVIDER - HOSPITAL COURSE
HPI:  32F w/ PMHX of MR, seizure disorder (stable), developmental delays, non-verbal at baseline but understands p/w 1-2 days of nausea and vomiting. 5 days ago rapid COVID test at home was positive. Had tmax of 101F that day which is what promoted rapid test. Has had nasal congestion, productive cough which have subsided. Mom called PMD to ask for abx 2 days ago as she felt pt looked tired with her cough and was prescribed cefdinir which patient tolerated in past. After 2nd does this AM, episodes of nausea and emesis began. Pt has had severe GI upset with different PO antibiotics in the past. Per mom, patient has not suggested abd pain, urinary or bowel changes. No hx of abd sx. No sick contacts or travel. LMP 2 wks ago.    In ER: Given LR 1L, zofran 4mg IV, reglan 10mg IV (06 Nov 2022 02:17)    Pt was admitted and given fluids. Her nausea and emesis resolved. She continued to have no respiratory symptoms. Pt medically optimized for discharge. HPI:  32F w/ PMHX of MR, seizure disorder (stable), developmental delays, non-verbal at baseline but understands p/w 1-2 days of nausea and vomiting. 5 days ago rapid COVID test at home was positive. Had tmax of 101F that day which is what promoted rapid test. Has had nasal congestion, productive cough which have subsided. Mom called PMD to ask for abx 2 days ago as she felt pt looked tired with her cough and was prescribed cefdinir which patient tolerated in past. After 2nd does this AM, episodes of nausea and emesis began. Pt has had severe GI upset with different PO antibiotics in the past. Per mom, patient has not suggested abd pain, urinary or bowel changes. No hx of abd sx. No sick contacts or travel. LMP 2 wks ago.    In ER: Given LR 1L, zofran 4mg IV, reglan 10mg IV (06 Nov 2022 02:17)    Pt was admitted and given fluids. Her nausea and emesis resolved. Family at bedside endorses patient returned to her baseline and want to take her home. She continued to have no respiratory symptoms. Pt medically optimized for discharge.

## 2022-11-06 NOTE — H&P ADULT - HISTORY OF PRESENT ILLNESS
32F w/ PMHX of MR, seizure disorder (stable), developmental delays, non-verbal at baseline but understands p/w 1-2 days of nausea and vomiting. 5 days ago rapid COVID test at home was positive. Had tmax of 101F that day which is what promoted rapid test. Has had nasal congestion, productive cough which have subsided. Mom called PMD to ask for abx 2 days ago as she felt pt looked tired with her cough and was prescribed cefdinir which patient tolerated in past. After 2nd does this AM, episodes of nausea and emesis began. Pt has had severe GI upset with different PO antibiotics in the past. Per mom, patient has not suggested abd pain, urinary or bowel changes. No hx of abd sx. No sick contacts or travel. LMP 2 wks ago.    In ER: Given LR 1L, zofran 4mg IV, reglan 10mg IV

## 2022-11-06 NOTE — PATIENT PROFILE ADULT - FALL HARM RISK - HARM RISK INTERVENTIONS

## 2022-11-06 NOTE — DISCHARGE NOTE PROVIDER - NSDCMRMEDTOKEN_GEN_ALL_CORE_FT
Keppra 100 mg/mL oral solution: 7.5 milliliter(s) orally 2 times a day  ondansetron 4 mg oral disintegrating strip: 1 each orally 3 times a day, As Needed -for nausea

## 2022-11-06 NOTE — H&P ADULT - PROBLEM SELECTOR PLAN 1
CT abd/pelvis w/o clear etiology. UA relatively normal. Hx suspicious that GI symptoms attributable to recent PO antibiotics.   -IV Zofran PRN  -PPI IV daily for now  -Supportive care  -Advance diet as tolerated  -Hold additional PO antibiotics for now

## 2022-11-06 NOTE — DISCHARGE NOTE PROVIDER - PROVIDER TOKENS
FREE:[LAST:[[facility]],PHONE:[(   )    -],FAX:[(   )    -],ESTABLISHEDPATIENT:[T]]

## 2022-11-06 NOTE — DISCHARGE NOTE NURSING/CASE MANAGEMENT/SOCIAL WORK - NSDCPEFALRISK_GEN_ALL_CORE
For information on Fall & Injury Prevention, visit: [email or web address] OR  [email or web address] OR  https://www.cdc.gov/steadi/patient.html

## 2022-11-06 NOTE — DISCHARGE NOTE PROVIDER - NSDCCPCAREPLAN_GEN_ALL_CORE_FT
PRINCIPAL DISCHARGE DIAGNOSIS  Diagnosis: Nausea & vomiting  Assessment and Plan of Treatment: You had nausea and emesis which resolved in the hospital. This may be a consequence of your COVID infection or a side effect of the antibiotics that you received. Take zofran only as needed. Please follow-up with your PCP if you have further nausea and emesis.      SECONDARY DISCHARGE DIAGNOSES  Diagnosis: COVID-19  Assessment and Plan of Treatment: You have been diagnosed with COVID. You did not have any respiratory symptoms while in the hospital. Please follow-up with your PCP in 2 weeks if needed.

## 2022-11-06 NOTE — PATIENT PROFILE ADULT - FUNCTIONAL ASSESSMENT - BASIC MOBILITY 6.
4-calculated by average/Not able to assess (calculate score using [facility] averaging method)

## 2022-11-06 NOTE — H&P ADULT - ASSESSMENT
32F w/ PMHX of MR, seizure disorder (stable), developmental delays, non-verbal at baseline but understands p/w 1-2 days of nausea and vomiting in setting of COVID

## 2022-11-06 NOTE — DISCHARGE NOTE PROVIDER - CARE PROVIDER_API CALL
[facility],   Phone: (   )    -  Fax: (   )    -  Established Patient  Follow Up Time:

## 2022-11-06 NOTE — H&P ADULT - PROBLEM SELECTOR PLAN 4
Non-verbal at baseline. Can say some simple single words.  -Mother by bedside  -Falls and aspiration precautions

## 2022-11-06 NOTE — DISCHARGE NOTE PROVIDER - ATTENDING DISCHARGE PHYSICAL EXAMINATION:
Vital Signs Last 24 Hrs  T(C): 37.1 (06 Nov 2022 01:25), Max: 37.1 (06 Nov 2022 01:25)  T(F): 98.7 (06 Nov 2022 01:25), Max: 98.7 (06 Nov 2022 01:25)  HR: 102 (06 Nov 2022 01:25) (69 - 103)  BP: 102/72 (06 Nov 2022 01:25) (102/72 - 160/107)  BP(mean): 84 (05 Nov 2022 21:10) (84 - 84)  RR: 18 (06 Nov 2022 01:25) (18 - 24)  SpO2: 100% (06 Nov 2022 01:25) (96% - 100%)    Parameters below as of 05 Nov 2022 21:10  Patient On (Oxygen Delivery Method): room air    GENERAL: NAD, alert, smiling  HEAD:  NCAT  EYES: EOMI, PERRL, conjunctiva and sclera clear  NECK: Supple, No JVD  CHEST/LUNG: Clear to auscultation bilaterally; No wheeze  HEART: Regular rate and rhythm; No murmurs, rubs, or gallops  ABDOMEN: Soft, Nontender, Nondistended; Bowel sounds present  EXTREMITIES:  2+ Peripheral Pulses, No clubbing, cyanosis, or edema  PSYCH: AAOx0, baseline nonverbal  NEUROLOGY: non-focal, [place]  SKIN: No rashes or lesions

## 2022-11-06 NOTE — H&P ADULT - NSHPPHYSICALEXAM_GEN_ALL_CORE
Vital Signs Last 24 Hrs  T(C): 36.9 (11-05-22 @ 21:10), Max: 36.9 (11-05-22 @ 21:10)  T(F): 98.5 (11-05-22 @ 21:10), Max: 98.5 (11-05-22 @ 21:10)  HR: 92 (11-05-22 @ 21:10) (69 - 103)  BP: 137/67 (11-05-22 @ 21:10) (111/76 - 160/107)  BP(mean): 84 (11-05-22 @ 21:10) (84 - 84)  RR: 20 (11-05-22 @ 21:10) (20 - 24)  SpO2: 98% (11-05-22 @ 21:10) (96% - 98%)

## 2022-11-11 ENCOUNTER — TRANSCRIPTION ENCOUNTER (OUTPATIENT)
Age: 32
End: 2022-11-11

## 2022-11-22 ENCOUNTER — NON-APPOINTMENT (OUTPATIENT)
Age: 32
End: 2022-11-22

## 2023-02-07 ENCOUNTER — APPOINTMENT (OUTPATIENT)
Dept: NEUROLOGY | Facility: CLINIC | Age: 33
End: 2023-02-07
Payer: COMMERCIAL

## 2023-02-07 PROCEDURE — 99214 OFFICE O/P EST MOD 30 MIN: CPT

## 2023-02-07 NOTE — DISCUSSION/SUMMARY
[Well-controlled] : well-controlled [Myoclonic] : myoclonic [Generalized] : generalized  [Idiopathic] : idiopathic  [FreeTextEntry1] : Impression-\par - epilepsy - generalized (myoclonic)\par - severe mental retardation\par - h/o scoliosis\par \par Plan\par - continue  Keppra sol'n 750 mg bid\par - annual lab work done by PMD\par - follow up in 12  months  call if any seizures\par \par Total time 22 minutes.

## 2023-02-07 NOTE — HISTORY OF PRESENT ILLNESS
[Left Handed] : left handed [Divalproex (Depakote)] : divalproex (Depakote) [Levetiracetam (Keppra)] : levetiracetam (Keppra) [Oxcarbazepine (Trileptal)] : oxcarbazepine (Trileptal) [Topiramate (Topamax)] : topiramate (Topamax) [FreeTextEntry1] : Mar 24, 1994  [FreeTextEntry2] : GTCs (initially had with fever) and myoclonus; EEG has shown generalized spike-wave discharges [de-identified] : 2004

## 2023-08-16 NOTE — HISTORY OF PRESENT ILLNESS
Original was signed.  I have resent this   [Left Handed] : left handed [Divalproex (Depakote)] : divalproex (Depakote) [Levetiracetam (Keppra)] : levetiracetam (Keppra) [Oxcarbazepine (Trileptal)] : oxcarbazepine (Trileptal) [Topiramate (Topamax)] : topiramate (Topamax) [FreeTextEntry1] : Mar 24, 1994  [FreeTextEntry2] : GTCs (initially had with fever) and myoclonus; EEG has shown generalized spike-wave discharges [de-identified] : 2004

## 2023-09-04 ENCOUNTER — INPATIENT (INPATIENT)
Facility: HOSPITAL | Age: 33
LOS: 2 days | Discharge: HOME | DRG: 690 | End: 2023-09-07
Attending: HOSPITALIST | Admitting: HOSPITALIST
Payer: MEDICARE

## 2023-09-04 VITALS
OXYGEN SATURATION: 97 % | DIASTOLIC BLOOD PRESSURE: 89 MMHG | SYSTOLIC BLOOD PRESSURE: 130 MMHG | WEIGHT: 117.95 LBS | HEIGHT: 59 IN | HEART RATE: 101 BPM | TEMPERATURE: 98 F | RESPIRATION RATE: 20 BRPM

## 2023-09-04 LAB
ALBUMIN SERPL ELPH-MCNC: 4.6 G/DL — SIGNIFICANT CHANGE UP (ref 3.3–5)
ALP SERPL-CCNC: 83 U/L — SIGNIFICANT CHANGE UP (ref 40–120)
ALT FLD-CCNC: 23 U/L — SIGNIFICANT CHANGE UP (ref 10–45)
ANION GAP SERPL CALC-SCNC: 15 MMOL/L — SIGNIFICANT CHANGE UP (ref 5–17)
APPEARANCE UR: CLEAR — SIGNIFICANT CHANGE UP
AST SERPL-CCNC: 49 U/L — HIGH (ref 10–40)
BACTERIA # UR AUTO: ABNORMAL
BASE EXCESS BLDV CALC-SCNC: 0.5 MMOL/L — SIGNIFICANT CHANGE UP (ref -2–3)
BASOPHILS # BLD AUTO: 0 K/UL — SIGNIFICANT CHANGE UP (ref 0–0.2)
BASOPHILS NFR BLD AUTO: 0 % — SIGNIFICANT CHANGE UP (ref 0–2)
BILIRUB SERPL-MCNC: 0.9 MG/DL — SIGNIFICANT CHANGE UP (ref 0.2–1.2)
BILIRUB UR-MCNC: NEGATIVE — SIGNIFICANT CHANGE UP
BUN SERPL-MCNC: 9 MG/DL — SIGNIFICANT CHANGE UP (ref 7–23)
CA-I SERPL-SCNC: 1.09 MMOL/L — LOW (ref 1.15–1.33)
CALCIUM SERPL-MCNC: 9.7 MG/DL — SIGNIFICANT CHANGE UP (ref 8.4–10.5)
CHLORIDE BLDV-SCNC: 100 MMOL/L — SIGNIFICANT CHANGE UP (ref 96–108)
CHLORIDE SERPL-SCNC: 99 MMOL/L — SIGNIFICANT CHANGE UP (ref 96–108)
CO2 BLDV-SCNC: 27 MMOL/L — HIGH (ref 22–26)
CO2 SERPL-SCNC: 22 MMOL/L — SIGNIFICANT CHANGE UP (ref 22–31)
COLOR SPEC: YELLOW — SIGNIFICANT CHANGE UP
CREAT SERPL-MCNC: 0.49 MG/DL — LOW (ref 0.5–1.3)
DIFF PNL FLD: NEGATIVE — SIGNIFICANT CHANGE UP
EGFR: 128 ML/MIN/1.73M2 — SIGNIFICANT CHANGE UP
EOSINOPHIL # BLD AUTO: 0 K/UL — SIGNIFICANT CHANGE UP (ref 0–0.5)
EOSINOPHIL NFR BLD AUTO: 0 % — SIGNIFICANT CHANGE UP (ref 0–6)
EPI CELLS # UR: 6 /HPF — HIGH
GAS PNL BLDV: 132 MMOL/L — LOW (ref 136–145)
GAS PNL BLDV: SIGNIFICANT CHANGE UP
GLUCOSE BLDV-MCNC: 114 MG/DL — HIGH (ref 70–99)
GLUCOSE SERPL-MCNC: 116 MG/DL — HIGH (ref 70–99)
GLUCOSE UR QL: NEGATIVE — SIGNIFICANT CHANGE UP
HCO3 BLDV-SCNC: 26 MMOL/L — SIGNIFICANT CHANGE UP (ref 22–29)
HCT VFR BLD CALC: 45.7 % — HIGH (ref 34.5–45)
HCT VFR BLDA CALC: 46 % — SIGNIFICANT CHANGE UP (ref 34.5–46.5)
HGB BLD CALC-MCNC: 15.4 G/DL — SIGNIFICANT CHANGE UP (ref 11.7–16.1)
HGB BLD-MCNC: 15.2 G/DL — SIGNIFICANT CHANGE UP (ref 11.5–15.5)
HYALINE CASTS # UR AUTO: 1 /LPF — SIGNIFICANT CHANGE UP (ref 0–2)
KETONES UR-MCNC: ABNORMAL
LACTATE BLDV-MCNC: 1.3 MMOL/L — SIGNIFICANT CHANGE UP (ref 0.5–2)
LEUKOCYTE ESTERASE UR-ACNC: ABNORMAL
LIDOCAIN IGE QN: 30 U/L — SIGNIFICANT CHANGE UP (ref 7–60)
LYMPHOCYTES # BLD AUTO: 0.7 K/UL — LOW (ref 1–3.3)
LYMPHOCYTES # BLD AUTO: 7 % — LOW (ref 13–44)
MANUAL SMEAR VERIFICATION: SIGNIFICANT CHANGE UP
MCHC RBC-ENTMCNC: 30.5 PG — SIGNIFICANT CHANGE UP (ref 27–34)
MCHC RBC-ENTMCNC: 33.3 GM/DL — SIGNIFICANT CHANGE UP (ref 32–36)
MCV RBC AUTO: 91.8 FL — SIGNIFICANT CHANGE UP (ref 80–100)
MONOCYTES # BLD AUTO: 0.26 K/UL — SIGNIFICANT CHANGE UP (ref 0–0.9)
MONOCYTES NFR BLD AUTO: 2.6 % — SIGNIFICANT CHANGE UP (ref 2–14)
NEUTROPHILS # BLD AUTO: 9.08 K/UL — HIGH (ref 1.8–7.4)
NEUTROPHILS NFR BLD AUTO: 90.4 % — HIGH (ref 43–77)
NITRITE UR-MCNC: NEGATIVE — SIGNIFICANT CHANGE UP
PCO2 BLDV: 44 MMHG — HIGH (ref 39–42)
PH BLDV: 7.38 — SIGNIFICANT CHANGE UP (ref 7.32–7.43)
PH UR: 6.5 — SIGNIFICANT CHANGE UP (ref 5–8)
PLAT MORPH BLD: NORMAL — SIGNIFICANT CHANGE UP
PLATELET # BLD AUTO: 272 K/UL — SIGNIFICANT CHANGE UP (ref 150–400)
PO2 BLDV: 49 MMHG — HIGH (ref 25–45)
POTASSIUM BLDV-SCNC: 7 MMOL/L — CRITICAL HIGH (ref 3.5–5.1)
POTASSIUM SERPL-MCNC: 4.8 MMOL/L — SIGNIFICANT CHANGE UP (ref 3.5–5.3)
POTASSIUM SERPL-SCNC: 4.8 MMOL/L — SIGNIFICANT CHANGE UP (ref 3.5–5.3)
PROT SERPL-MCNC: 8.8 G/DL — HIGH (ref 6–8.3)
PROT UR-MCNC: ABNORMAL
RBC # BLD: 4.98 M/UL — SIGNIFICANT CHANGE UP (ref 3.8–5.2)
RBC # FLD: 12.1 % — SIGNIFICANT CHANGE UP (ref 10.3–14.5)
RBC BLD AUTO: NORMAL — SIGNIFICANT CHANGE UP
RBC CASTS # UR COMP ASSIST: 1 /HPF — SIGNIFICANT CHANGE UP (ref 0–4)
SAO2 % BLDV: 81.9 % — SIGNIFICANT CHANGE UP (ref 67–88)
SODIUM SERPL-SCNC: 136 MMOL/L — SIGNIFICANT CHANGE UP (ref 135–145)
SP GR SPEC: 1.02 — SIGNIFICANT CHANGE UP (ref 1.01–1.02)
UROBILINOGEN FLD QL: NEGATIVE — SIGNIFICANT CHANGE UP
WBC # BLD: 10.04 K/UL — SIGNIFICANT CHANGE UP (ref 3.8–10.5)
WBC # FLD AUTO: 10.04 K/UL — SIGNIFICANT CHANGE UP (ref 3.8–10.5)
WBC UR QL: 13 /HPF — HIGH (ref 0–5)

## 2023-09-04 PROCEDURE — G1004: CPT

## 2023-09-04 PROCEDURE — 71045 X-RAY EXAM CHEST 1 VIEW: CPT | Mod: 26

## 2023-09-04 PROCEDURE — 99285 EMERGENCY DEPT VISIT HI MDM: CPT

## 2023-09-04 PROCEDURE — 74177 CT ABD & PELVIS W/CONTRAST: CPT | Mod: 26,MG

## 2023-09-04 RX ORDER — ONDANSETRON 8 MG/1
4 TABLET, FILM COATED ORAL ONCE
Refills: 0 | Status: COMPLETED | OUTPATIENT
Start: 2023-09-04 | End: 2023-09-04

## 2023-09-04 RX ORDER — LEVETIRACETAM 250 MG/1
250 TABLET, FILM COATED ORAL ONCE
Refills: 0 | Status: COMPLETED | OUTPATIENT
Start: 2023-09-04 | End: 2023-09-04

## 2023-09-04 RX ORDER — FAMOTIDINE 10 MG/ML
20 INJECTION INTRAVENOUS ONCE
Refills: 0 | Status: COMPLETED | OUTPATIENT
Start: 2023-09-04 | End: 2023-09-04

## 2023-09-04 RX ORDER — METOCLOPRAMIDE HCL 10 MG
10 TABLET ORAL ONCE
Refills: 0 | Status: COMPLETED | OUTPATIENT
Start: 2023-09-04 | End: 2023-09-04

## 2023-09-04 RX ORDER — SODIUM CHLORIDE 9 MG/ML
1000 INJECTION INTRAMUSCULAR; INTRAVENOUS; SUBCUTANEOUS ONCE
Refills: 0 | Status: COMPLETED | OUTPATIENT
Start: 2023-09-04 | End: 2023-09-04

## 2023-09-04 RX ADMIN — LEVETIRACETAM 400 MILLIGRAM(S): 250 TABLET, FILM COATED ORAL at 22:49

## 2023-09-04 RX ADMIN — FAMOTIDINE 20 MILLIGRAM(S): 10 INJECTION INTRAVENOUS at 20:15

## 2023-09-04 RX ADMIN — SODIUM CHLORIDE 1000 MILLILITER(S): 9 INJECTION INTRAMUSCULAR; INTRAVENOUS; SUBCUTANEOUS at 20:14

## 2023-09-04 RX ADMIN — LEVETIRACETAM 250 MILLIGRAM(S): 250 TABLET, FILM COATED ORAL at 23:26

## 2023-09-04 RX ADMIN — SODIUM CHLORIDE 1000 MILLILITER(S): 9 INJECTION INTRAMUSCULAR; INTRAVENOUS; SUBCUTANEOUS at 22:52

## 2023-09-04 RX ADMIN — Medication 10 MILLIGRAM(S): at 22:08

## 2023-09-04 RX ADMIN — ONDANSETRON 4 MILLIGRAM(S): 8 TABLET, FILM COATED ORAL at 20:14

## 2023-09-04 NOTE — ED PROVIDER NOTE - ATTENDING CONTRIBUTION TO CARE
32F w/ PMHX of MR, seizure disorder (stable), developmental delays, non-verbal at baseline but understands p/w 1-2 days of nausea and vomiting, similar to prior admission in the past, Per family no abnormal foods sick contacts or any other cause which can explain symptoms, patient arrives with tachycardia, elevated temp, continue to vomit.  Patient cannot endorse any symptoms, but does point to her epigastrium and her abdomen is source of discomfort and pain.    CONSTITUTIONAL: developmental delay  SKIN: Warm dry, normal skin turgor  HEAD: NCAT  ENT: normal pharynx with no erythema or exudates  NECK: Supple; non tender. Full ROM.  CARD: tachycardia 105, no murmurs.  RESP: clear to ausculation b/l. No crackles or wheezing.  ABD: soft, mild epigastric tenderness, non-distended, no rebound or guarding.  MSK: No pedal edema, no calf tenderness  PSYCH: Cooperative, nonverbal    Patient developmental delay, will get CT abdomen pelvis to for cause of vomiting give symptomatic control give IV fluids, with patient not yet febrile in ED, will monitor for fever, dispo pending imaging and response to symptomatic control.  Concern for infectious etiology cardiac less likely we will check lipase for possible pancreatitis, vomiting consider electrolyte abnormalities, not likely from intracranial injury or hemorrhage considering patient's abdominal pain and is currently acting at baseline, no head trauma.

## 2023-09-04 NOTE — ED ADULT NURSE REASSESSMENT NOTE - NS ED NURSE REASSESS COMMENT FT1
Received report from CRISTINA Camarillo RN. Patient presenting to the ED with complaints of vomiting beginning 1500. Per family, last bowel movement was yesterday and normal. Patient is nonverbal at baseline. Comfort and safety measures maintained.

## 2023-09-05 DIAGNOSIS — R11.2 NAUSEA WITH VOMITING, UNSPECIFIED: ICD-10-CM

## 2023-09-05 DIAGNOSIS — G40.909 EPILEPSY, UNSPECIFIED, NOT INTRACTABLE, WITHOUT STATUS EPILEPTICUS: ICD-10-CM

## 2023-09-05 DIAGNOSIS — R62.50 UNSPECIFIED LACK OF EXPECTED NORMAL PHYSIOLOGICAL DEVELOPMENT IN CHILDHOOD: ICD-10-CM

## 2023-09-05 DIAGNOSIS — N39.0 URINARY TRACT INFECTION, SITE NOT SPECIFIED: ICD-10-CM

## 2023-09-05 DIAGNOSIS — Z29.9 ENCOUNTER FOR PROPHYLACTIC MEASURES, UNSPECIFIED: ICD-10-CM

## 2023-09-05 DIAGNOSIS — Z98.890 OTHER SPECIFIED POSTPROCEDURAL STATES: Chronic | ICD-10-CM

## 2023-09-05 LAB
ALBUMIN SERPL ELPH-MCNC: 4.1 G/DL — SIGNIFICANT CHANGE UP (ref 3.3–5)
ALP SERPL-CCNC: 69 U/L — SIGNIFICANT CHANGE UP (ref 40–120)
ALT FLD-CCNC: 22 U/L — SIGNIFICANT CHANGE UP (ref 10–45)
ANION GAP SERPL CALC-SCNC: 16 MMOL/L — SIGNIFICANT CHANGE UP (ref 5–17)
AST SERPL-CCNC: 27 U/L — SIGNIFICANT CHANGE UP (ref 10–40)
BASOPHILS # BLD AUTO: 0.01 K/UL — SIGNIFICANT CHANGE UP (ref 0–0.2)
BASOPHILS NFR BLD AUTO: 0.1 % — SIGNIFICANT CHANGE UP (ref 0–2)
BILIRUB SERPL-MCNC: 0.4 MG/DL — SIGNIFICANT CHANGE UP (ref 0.2–1.2)
BUN SERPL-MCNC: 11 MG/DL — SIGNIFICANT CHANGE UP (ref 7–23)
CALCIUM SERPL-MCNC: 9 MG/DL — SIGNIFICANT CHANGE UP (ref 8.4–10.5)
CHLORIDE SERPL-SCNC: 104 MMOL/L — SIGNIFICANT CHANGE UP (ref 96–108)
CO2 SERPL-SCNC: 20 MMOL/L — LOW (ref 22–31)
CREAT SERPL-MCNC: 0.59 MG/DL — SIGNIFICANT CHANGE UP (ref 0.5–1.3)
EGFR: 122 ML/MIN/1.73M2 — SIGNIFICANT CHANGE UP
EOSINOPHIL # BLD AUTO: 0 K/UL — SIGNIFICANT CHANGE UP (ref 0–0.5)
EOSINOPHIL NFR BLD AUTO: 0 % — SIGNIFICANT CHANGE UP (ref 0–6)
GLUCOSE SERPL-MCNC: 113 MG/DL — HIGH (ref 70–99)
HCT VFR BLD CALC: 39.6 % — SIGNIFICANT CHANGE UP (ref 34.5–45)
HGB BLD-MCNC: 12.8 G/DL — SIGNIFICANT CHANGE UP (ref 11.5–15.5)
IMM GRANULOCYTES NFR BLD AUTO: 0.6 % — SIGNIFICANT CHANGE UP (ref 0–0.9)
LYMPHOCYTES # BLD AUTO: 1.88 K/UL — SIGNIFICANT CHANGE UP (ref 1–3.3)
LYMPHOCYTES # BLD AUTO: 18.6 % — SIGNIFICANT CHANGE UP (ref 13–44)
MAGNESIUM SERPL-MCNC: 2.3 MG/DL — SIGNIFICANT CHANGE UP (ref 1.6–2.6)
MCHC RBC-ENTMCNC: 30.4 PG — SIGNIFICANT CHANGE UP (ref 27–34)
MCHC RBC-ENTMCNC: 32.3 GM/DL — SIGNIFICANT CHANGE UP (ref 32–36)
MCV RBC AUTO: 94.1 FL — SIGNIFICANT CHANGE UP (ref 80–100)
MONOCYTES # BLD AUTO: 0.87 K/UL — SIGNIFICANT CHANGE UP (ref 0–0.9)
MONOCYTES NFR BLD AUTO: 8.6 % — SIGNIFICANT CHANGE UP (ref 2–14)
NEUTROPHILS # BLD AUTO: 7.3 K/UL — SIGNIFICANT CHANGE UP (ref 1.8–7.4)
NEUTROPHILS NFR BLD AUTO: 72.1 % — SIGNIFICANT CHANGE UP (ref 43–77)
NRBC # BLD: 0 /100 WBCS — SIGNIFICANT CHANGE UP (ref 0–0)
PHOSPHATE SERPL-MCNC: 2.2 MG/DL — LOW (ref 2.5–4.5)
PLATELET # BLD AUTO: 231 K/UL — SIGNIFICANT CHANGE UP (ref 150–400)
POTASSIUM SERPL-MCNC: 3.7 MMOL/L — SIGNIFICANT CHANGE UP (ref 3.5–5.3)
POTASSIUM SERPL-SCNC: 3.7 MMOL/L — SIGNIFICANT CHANGE UP (ref 3.5–5.3)
PROT SERPL-MCNC: 7.3 G/DL — SIGNIFICANT CHANGE UP (ref 6–8.3)
RAPID RVP RESULT: SIGNIFICANT CHANGE UP
RBC # BLD: 4.21 M/UL — SIGNIFICANT CHANGE UP (ref 3.8–5.2)
RBC # FLD: 12.1 % — SIGNIFICANT CHANGE UP (ref 10.3–14.5)
SARS-COV-2 RNA SPEC QL NAA+PROBE: SIGNIFICANT CHANGE UP
SODIUM SERPL-SCNC: 140 MMOL/L — SIGNIFICANT CHANGE UP (ref 135–145)
WBC # BLD: 10.12 K/UL — SIGNIFICANT CHANGE UP (ref 3.8–10.5)
WBC # FLD AUTO: 10.12 K/UL — SIGNIFICANT CHANGE UP (ref 3.8–10.5)

## 2023-09-05 PROCEDURE — 99223 1ST HOSP IP/OBS HIGH 75: CPT

## 2023-09-05 RX ORDER — ENOXAPARIN SODIUM 100 MG/ML
40 INJECTION SUBCUTANEOUS EVERY 24 HOURS
Refills: 0 | Status: DISCONTINUED | OUTPATIENT
Start: 2023-09-05 | End: 2023-09-07

## 2023-09-05 RX ORDER — CYCLOSPORINE 0.5 MG/ML
1 EMULSION OPHTHALMIC
Refills: 0 | Status: DISCONTINUED | OUTPATIENT
Start: 2023-09-05 | End: 2023-09-07

## 2023-09-05 RX ORDER — POTASSIUM PHOSPHATE, MONOBASIC POTASSIUM PHOSPHATE, DIBASIC 236; 224 MG/ML; MG/ML
15 INJECTION, SOLUTION INTRAVENOUS ONCE
Refills: 0 | Status: COMPLETED | OUTPATIENT
Start: 2023-09-05 | End: 2023-09-05

## 2023-09-05 RX ORDER — SODIUM CHLORIDE 9 MG/ML
1000 INJECTION, SOLUTION INTRAVENOUS
Refills: 0 | Status: COMPLETED | OUTPATIENT
Start: 2023-09-05 | End: 2023-09-05

## 2023-09-05 RX ORDER — LEVETIRACETAM 250 MG/1
750 TABLET, FILM COATED ORAL
Refills: 0 | Status: DISCONTINUED | OUTPATIENT
Start: 2023-09-05 | End: 2023-09-07

## 2023-09-05 RX ORDER — CEFTRIAXONE 500 MG/1
INJECTION, POWDER, FOR SOLUTION INTRAMUSCULAR; INTRAVENOUS
Refills: 0 | Status: DISCONTINUED | OUTPATIENT
Start: 2023-09-05 | End: 2023-09-07

## 2023-09-05 RX ORDER — CEFTRIAXONE 500 MG/1
1000 INJECTION, POWDER, FOR SOLUTION INTRAMUSCULAR; INTRAVENOUS ONCE
Refills: 0 | Status: COMPLETED | OUTPATIENT
Start: 2023-09-05 | End: 2023-09-05

## 2023-09-05 RX ORDER — LEVETIRACETAM 250 MG/1
750 TABLET, FILM COATED ORAL
Refills: 0 | Status: DISCONTINUED | OUTPATIENT
Start: 2023-09-05 | End: 2023-09-05

## 2023-09-05 RX ORDER — ONDANSETRON 8 MG/1
4 TABLET, FILM COATED ORAL ONCE
Refills: 0 | Status: COMPLETED | OUTPATIENT
Start: 2023-09-05 | End: 2023-09-05

## 2023-09-05 RX ORDER — CEFTRIAXONE 500 MG/1
1000 INJECTION, POWDER, FOR SOLUTION INTRAMUSCULAR; INTRAVENOUS EVERY 24 HOURS
Refills: 0 | Status: DISCONTINUED | OUTPATIENT
Start: 2023-09-06 | End: 2023-09-07

## 2023-09-05 RX ORDER — SODIUM CHLORIDE 9 MG/ML
1000 INJECTION, SOLUTION INTRAVENOUS
Refills: 0 | Status: DISCONTINUED | OUTPATIENT
Start: 2023-09-05 | End: 2023-09-05

## 2023-09-05 RX ORDER — ACETAMINOPHEN 500 MG
650 TABLET ORAL EVERY 6 HOURS
Refills: 0 | Status: DISCONTINUED | OUTPATIENT
Start: 2023-09-05 | End: 2023-09-07

## 2023-09-05 RX ADMIN — ENOXAPARIN SODIUM 40 MILLIGRAM(S): 100 INJECTION SUBCUTANEOUS at 17:09

## 2023-09-05 RX ADMIN — CYCLOSPORINE 1 DROP(S): 0.5 EMULSION OPHTHALMIC at 17:08

## 2023-09-05 RX ADMIN — SODIUM CHLORIDE 100 MILLILITER(S): 9 INJECTION, SOLUTION INTRAVENOUS at 08:18

## 2023-09-05 RX ADMIN — ONDANSETRON 4 MILLIGRAM(S): 8 TABLET, FILM COATED ORAL at 02:01

## 2023-09-05 RX ADMIN — LEVETIRACETAM 750 MILLIGRAM(S): 250 TABLET, FILM COATED ORAL at 07:54

## 2023-09-05 RX ADMIN — POTASSIUM PHOSPHATE, MONOBASIC POTASSIUM PHOSPHATE, DIBASIC 62.5 MILLIMOLE(S): 236; 224 INJECTION, SOLUTION INTRAVENOUS at 12:25

## 2023-09-05 RX ADMIN — SODIUM CHLORIDE 100 MILLILITER(S): 9 INJECTION, SOLUTION INTRAVENOUS at 12:26

## 2023-09-05 RX ADMIN — CEFTRIAXONE 100 MILLIGRAM(S): 500 INJECTION, POWDER, FOR SOLUTION INTRAMUSCULAR; INTRAVENOUS at 07:06

## 2023-09-05 RX ADMIN — LEVETIRACETAM 750 MILLIGRAM(S): 250 TABLET, FILM COATED ORAL at 17:06

## 2023-09-05 NOTE — H&P ADULT - PROBLEM SELECTOR PLAN 5
Chemical DVT ppx: HSQ  Diet: advance as tolerates pending dysphagia screen  Precautions: fall/aspiration/seizure as above   Dispo: pending course, PT recs (c/s placed) Chemical DVT ppx: lovenox subq   Diet: advance as tolerates  Precautions: fall/aspiration/seizure as above   Dispo: pending course, PT recs (c/s placed)

## 2023-09-05 NOTE — H&P ADULT - NSHPLABSRESULTS_GEN_ALL_CORE
EKG personally reviewed tachycardia 112BPM, sinus rhythm, intervals grossly wnl including QTc 466ms, no pathologic-appearing ST segment elevations/depressions, TWI present III, V2 EKG personally reviewed tachycardia 112BPM, sinus rhythm, intervals grossly wnl including QTc 466ms, no pathologic-appearing ST segment elevations/depressions, TWI present III, V2 (new since 11/2022)

## 2023-09-05 NOTE — PATIENT PROFILE ADULT - FALL HARM RISK - HARM RISK INTERVENTIONS

## 2023-09-05 NOTE — H&P ADULT - PROBLEM SELECTOR PLAN 2
UA c/f infxn, NV at bl therefore difficult to assess sx  - empiric tx w/ CTX, narrow per UCx   - monitor fever/WBC curve

## 2023-09-05 NOTE — PATIENT PROFILE ADULT - HAVE YOU BEEN EATING POORLY BECAUSE OF A DECREASED APPETITE?
**Certification    PHYSICIAN Certification of Need for Inpatient Hospitalization    Based on the his current state of illness, Pearl Alvarez requires inpatient hospitalization for his CAD
No (0)

## 2023-09-05 NOTE — ED ADULT NURSE REASSESSMENT NOTE - NS ED NURSE REASSESS COMMENT FT1
Handoff received from previous RN, pt nonverbal at baseline, no signs of pain, VSS, eyes open spontaneously, family at bedside. Redness noted to IV site, family at bedside states redness appeared earlier this morning, no obvious swelling or warmth noted, IV flushing properly with no signs of discomfort from pt, provided ice pack. Pt and family updated on POC, awaiting bed assignment.

## 2023-09-05 NOTE — H&P ADULT - PROBLEM SELECTOR PLAN 3
NV bl, family assists  - fall precaution NV bl, family assists  - fall precaution  - family made aware to inform RN if they leave bedside for any reason, as pt requires supervision

## 2023-09-05 NOTE — H&P ADULT - NSICDXPASTMEDICALHX_GEN_ALL_CORE_FT
PAST MEDICAL HISTORY:  Developmental delay     Scoliosis     Seizure      PAST MEDICAL HISTORY:  Developmental delay     H/O corneal abrasion     Scoliosis     Seizure

## 2023-09-05 NOTE — ED ADULT NURSE REASSESSMENT NOTE - NS ED NURSE REASSESS COMMENT FT1
RN contacted admitting Medicine team, OK to give PO Keppra this morning. RN contacted admitting Medicine team, OK to give PO Keppra this morning. Pharmacy contacted, medication to be sent.

## 2023-09-05 NOTE — H&P ADULT - ASSESSMENT
32F developmental delay (NV bl), seizure d/o, admit 11/2022 for n/v resolved soon after admit, now p/w n/v x days, UA c/f UTI and found to have prominent cluster of right lower quadrant nodes of unclear etiology on CTAP IVC  32F developmental delay (NV bl), seizure d/o (generalized, myoclonic, followed by neuro Dr. Correa), admit 11/2022 for n/v resolved soon after admit, now p/w n/v x days, UA c/f UTI and found to have prominent cluster of right lower quadrant nodes of unclear etiology on CTAP IVC  32F developmental delay (NV bl), seizure d/o (generalized, myoclonic, followed by neuro Dr. Correa), L corneal damage (due to rubbing eye, followed by ophtho), admit 6/2015 for persistent n/v iso UTI/abx, 11/2022 for n/v resolved soon after admit now p/w n/v x 1 day, found to have UA c/f UTI and prominent cluster of right lower quadrant nodes of unclear etiology on CTAP IVC; pt sx improving in ED

## 2023-09-05 NOTE — H&P ADULT - HISTORY OF PRESENT ILLNESS
32F developmental delay (NV bl), seizure d/o, admit 11/2022 for n/v resolved soon after admit, now p/w n/v x days.    Of note pt also admit 6/2015 for persistent n/v iso abx tx for UTI.     ED Course  VS: afebrile tmax 37.6C, tachycardic HR 100s-110, BP 110s-130s/70s-100, tachypneic RR 20-24, SpO2% 94-97 RA   Labs: WBC wnl though neutrophil predominant w/ relative lymphopenia; AST elevated (hemolyzed); VBG 7.38/44/49/26 lact 1.3   Micro: UA mod ketone, mod LE, 13 WBC, few bact   Imaging:   CXR w/o acute finding  CTAP IVC prominent cluster of right lower quadrant nodes of unclear etiology  Received: zofran 4mg IV x 2, famotidine 20mg IV, keppra 250mg IV, reglan 10mg IV, NS 1L IV    admit to medicine for further eval/mgt  32F developmental delay (NV bl), seizure d/o, L corneal damage (due to rubbing eye, followed by ophtho), admit 6/2015 for persistent n/v iso UTI/abx, 11/2022 for n/v resolved soon after admit, now p/w n/v x days. Pt mother Lydia at bedside, along w/ Devante who is a family member/caretaker, who provide collateral as pt unable to participate in interview. Relay bl health when 9/4 began to experience nausea w/ multiple episodes (approx 20) of NBNB emesis, indicating toward her abd which they interpret as pain, felt warm though tmax at home 99F, assc poor po intake, prompting presentation to ED. While in ED she has urinary frequency w/o dysuria/hematuria/other change in appearance/malodor, and has not vomited since 22:00, w/ improved nausea, otherwise not endorsing noticed HA, chills, visual disturbance, rhinorrhea, odynophagia, dysphagia, CP, palpitations, SOB, cough, diarrhea, melena/hematochezia, or other complaint.     Pt w/o recent travel, though mother Lydia recently returned from [place] x days ago, no known sick contacts.    ED Course  VS: afebrile tmax 37.6C, tachycardic HR 100s-110, BP 110s-130s/70s-100, tachypneic RR 20-24, SpO2% 94-97 RA   Labs: WBC wnl though neutrophil predominant w/ relative lymphopenia; AST elevated (hemolyzed); VBG 7.38/44/49/26 lact 1.3   Micro: UA mod ketone, mod LE, 13 WBC, few bact   Imaging:   CXR w/o acute finding  CTAP IVC prominent cluster of right lower quadrant nodes of unclear etiology  Received: zofran 4mg IV x 2, famotidine 20mg IV, keppra 250mg IV, reglan 10mg IV, NS 1L IV    admit to medicine for further eval/mgt  32F developmental delay (NV bl), seizure d/o (generalized, myoclonic, followed by neuro Dr. Correa), L corneal damage (due to rubbing eye, followed by ophtho), admit 6/2015 for persistent n/v iso UTI/abx, 11/2022 for n/v resolved soon after admit, now p/w n/v x 1 day. Pt mother Lydia at bedside, along w/ Devante who is a family member/caretaker, who provide collateral as pt unable to participate in interview. Relay bl health when 9/4 began to experience nausea w/ multiple episodes (approx 20) of NBNB emesis, indicating toward her abd which they interpret as pain, felt warm though tmax at home 99F, assc poor po intake, prompting presentation to ED. While in ED she has urinary frequency w/o dysuria/hematuria/other change in appearance/malodor, and has not vomited since 22:00, w/ improved nausea, otherwise not endorsing noticed HA, chills, visual disturbance, rhinorrhea, odynophagia, dysphagia, CP, palpitations, SOB, cough, diarrhea, melena/hematochezia, or other complaint.     Pt w/o recent travel, though mother Lydia recently returned from [place] x days ago, no known sick contacts.    ED Course  VS: afebrile tmax 37.6C, tachycardic HR 100s-110, BP 110s-130s/70s-100, tachypneic RR 20-24, SpO2% 94-97 RA   Labs: WBC wnl though neutrophil predominant w/ relative lymphopenia; AST elevated (hemolyzed); VBG 7.38/44/49/26 lact 1.3   Micro: UA mod ketone, mod LE, 13 WBC, few bact   Imaging:   CXR w/o acute finding  CTAP IVC prominent cluster of right lower quadrant nodes of unclear etiology  Received: zofran 4mg IV x 2, famotidine 20mg IV, keppra 250mg IV, reglan 10mg IV, NS 1L IV    admit to medicine for further eval/mgt

## 2023-09-05 NOTE — PATIENT PROFILE ADULT - IS PATIENT POST-MENOPAUSAL?
[FreeTextEntry1] : Mr. HEREDIA is a 39 year old man with mildly elevated AST/ALT since at least 2014, in August 2018 35/71, and GGT to 150, obesity (BMI 35, 25 lb weight gain in 4 yrs), increased alcohol intake of 3 beers/day and 5-6 beers on week-end days. \par Serologic workup negative; will obtain AMA. He has borderline thrombocytopenia - PLT are only 150 G/L - may have portal HTN.\par US and MRI showed no NAFLD, ME elastography was normal.\par No recent LFTs available. Possible cause of LFT elevation is alcohol intake and DILI due to unknown substance.\par \par Will repeat LFTs today and he will call next week to discuss results.\par Will order f/u visit in 6 months after repeat labs. If his labs are normal today, LFTs can be checked again in a year.\par  no

## 2023-09-05 NOTE — ED ADULT NURSE REASSESSMENT NOTE - NS ED NURSE REASSESS COMMENT FT1
Patient with PMHx of development delay. Nonverbal at baseline. Unable to assess strength in bilateral upper and lower extremities. Patient with full ROM in all extremities, ambulatory.

## 2023-09-05 NOTE — H&P ADULT - NSHPPHYSICALEXAM_GEN_ALL_CORE
PHYSICAL EXAM:  GENERAL: NAD, well-groomed, unable to participate verbally in interview though smiling to examiner, requiring assistance from family at bedside to sit still during exam  HEAD: Atraumatic, Normocephalic  EYES: PERRL +mild conjunctival injection   ENMT: Visualized MMM otherwise difficult to assess oropharynx given participation   NECK: Supple  NERVOUS SYSTEM: Alert, tracking examiner, unable to assess orientation given NV bl, moving all extremities  CHEST/LUNG: Clear to auscultation bilaterally; No rales, rhonchi, wheezing, or rubs  HEART: +Tachycardic w/ regular rhythm; No murmurs, rubs, or gallops  ABDOMEN: Soft, Nontender, Nondistended; Bowel sounds present. No guarding, rebound tenderness, or rigidity.  EXTREMITIES: 2+ Peripheral Pulses, No edema

## 2023-09-05 NOTE — CHART NOTE - NSCHARTNOTEFT_GEN_A_CORE
Seen and examined at bedside. 32F developmental delay (NV bl), seizure d/o (generalized, myoclonic, followed by neuro Dr. Correa), L corneal damage (due to rubbing eye, followed by ophtho), admit 6/2015 for persistent n/v 2/2 UTI, 11/2022 for n/v resolved soon after admissions now p/w n/v x 1 day, found to have UA likely showing UTI and prominent cluster of right lower quadrant nodes of unclear etiology on CTAP.     #Nausea & vomiting   - CT as above. Cluster of right lower quadrant nodes may not be contributing to her nausea and vomiting. should be followes up as outpatient.   - RVP/COVID negative.   - tolerating liquid diet this morning, mom thinks patient would like to trial regular diet and so diet is advanced.   - Ceftriaxone for UTI ( 9/5 - )  - given new TWI and unable to verbalize if cardiac complaints, Saurabh were obtained, showed troponin <6, CK mildly elevated  - if sx not continuing to improve, would consider MR abd for further characterization of abn CT findings, otherwise can consider oupt f/u    #Urinary tract infection.   - empiric tx w/ CTX. Unfortunately UCx was not sent yet, will try to add on, overall utility of new UCx is low now   - mother has a book with medical information - 2015 UCx grew >100k klebsiella, 40k enterococcus. Was on cefdenir multiple times   - monitor fever/WBC curve.    #Seizure disorder.   - c/w home AED (received dose in ED)   - caution re seizure threshold lowering meds  - seizure precautions.    Updated mother at bedside    d/w ACP

## 2023-09-05 NOTE — H&P ADULT - PROBLEM SELECTOR PLAN 1
n/v x days, hx of admit for same 2022 which resolved soon after w/o intervention and 2015 which was assc w/ UTI/abx  c/f inadequate PO intake given tachycardia, ketonuria  CTAP IVC prominent cluster of right lower quadrant nodes of unclear etiology  - tx empirically for UTI as below  - supportive care for n/v (monitor QTc w/ antiemetic use)  - mIVF until able to tolerate PO   - if sx not improving, would consider MR abd for further characterization of abn CT findings, otherwise can consider oupt f/u  - aspiration precaution n/v x 1 day, hx of admit for same 2022 which resolved soon after w/o intervention and 2015 which was assc w/ UTI/abx  c/f inadequate PO intake given tachycardia, ketonuria  CTAP IVC prominent cluster of right lower quadrant nodes of unclear etiology  sx improving in ED   - tx empirically for UTI as below  - RVP/COV swab given mother w/ recent international travel ([place])   - supportive care for n/v (monitor QTc w/ antiemetic use)  - mIVF until able to tolerate PO   - given new TWI and unable to verbalize if cardic complaints will add Saurabh  - if sx not continuing to improve, would consider MR abd for further characterization of abn CT findings, otherwise can consider oupt f/u  - aspiration precaution n/v x 1 day, hx of admit for same 2022 which resolved soon after w/o intervention and 2015 which was assc w/ UTI/abx  c/f inadequate PO intake given tachycardia, ketonuria  CTAP IVC prominent cluster of right lower quadrant nodes of unclear etiology  sx improving in ED   - tx empirically for UTI as below  - RVP/COV swab given mother w/ recent international travel ([place]), conjunctival injection  - supportive care for n/v (monitor QTc w/ antiemetic use)  - mIVF until able to tolerate PO   - given new TWI and unable to verbalize if cardic complaints will add Saurabh  - if sx not continuing to improve, would consider MR abd for further characterization of abn CT findings, otherwise can consider oupt f/u  - aspiration precaution

## 2023-09-05 NOTE — H&P ADULT - NSICDXPASTSURGICALHX_GEN_ALL_CORE_FT
PAST SURGICAL HISTORY:  No significant past surgical history      PAST SURGICAL HISTORY:  H/O eye surgery

## 2023-09-05 NOTE — H&P ADULT - PROBLEM SELECTOR PLAN 4
- c/w home AED (received dose in ED)   - caution re seizure threshold lowering meds  - seizure precautions

## 2023-09-06 LAB
ANION GAP SERPL CALC-SCNC: 12 MMOL/L — SIGNIFICANT CHANGE UP (ref 5–17)
BUN SERPL-MCNC: 12 MG/DL — SIGNIFICANT CHANGE UP (ref 7–23)
CALCIUM SERPL-MCNC: 8.7 MG/DL — SIGNIFICANT CHANGE UP (ref 8.4–10.5)
CHLORIDE SERPL-SCNC: 104 MMOL/L — SIGNIFICANT CHANGE UP (ref 96–108)
CK SERPL-CCNC: 308 U/L — HIGH (ref 25–170)
CO2 SERPL-SCNC: 23 MMOL/L — SIGNIFICANT CHANGE UP (ref 22–31)
CREAT SERPL-MCNC: 0.53 MG/DL — SIGNIFICANT CHANGE UP (ref 0.5–1.3)
CULTURE RESULTS: SIGNIFICANT CHANGE UP
EGFR: 125 ML/MIN/1.73M2 — SIGNIFICANT CHANGE UP
GLUCOSE SERPL-MCNC: 76 MG/DL — SIGNIFICANT CHANGE UP (ref 70–99)
HCT VFR BLD CALC: 38.8 % — SIGNIFICANT CHANGE UP (ref 34.5–45)
HGB BLD-MCNC: 12.4 G/DL — SIGNIFICANT CHANGE UP (ref 11.5–15.5)
LDH SERPL L TO P-CCNC: 260 U/L — HIGH (ref 50–242)
MAGNESIUM SERPL-MCNC: 2.2 MG/DL — SIGNIFICANT CHANGE UP (ref 1.6–2.6)
MCHC RBC-ENTMCNC: 31 PG — SIGNIFICANT CHANGE UP (ref 27–34)
MCHC RBC-ENTMCNC: 32 GM/DL — SIGNIFICANT CHANGE UP (ref 32–36)
MCV RBC AUTO: 97 FL — SIGNIFICANT CHANGE UP (ref 80–100)
NRBC # BLD: 0 /100 WBCS — SIGNIFICANT CHANGE UP (ref 0–0)
PHOSPHATE SERPL-MCNC: 2.5 MG/DL — SIGNIFICANT CHANGE UP (ref 2.5–4.5)
PLATELET # BLD AUTO: 175 K/UL — SIGNIFICANT CHANGE UP (ref 150–400)
POTASSIUM SERPL-MCNC: 4.2 MMOL/L — SIGNIFICANT CHANGE UP (ref 3.5–5.3)
POTASSIUM SERPL-SCNC: 4.2 MMOL/L — SIGNIFICANT CHANGE UP (ref 3.5–5.3)
RBC # BLD: 4 M/UL — SIGNIFICANT CHANGE UP (ref 3.8–5.2)
RBC # FLD: 12.2 % — SIGNIFICANT CHANGE UP (ref 10.3–14.5)
SODIUM SERPL-SCNC: 139 MMOL/L — SIGNIFICANT CHANGE UP (ref 135–145)
SPECIMEN SOURCE: SIGNIFICANT CHANGE UP
WBC # BLD: 10.08 K/UL — SIGNIFICANT CHANGE UP (ref 3.8–10.5)
WBC # FLD AUTO: 10.08 K/UL — SIGNIFICANT CHANGE UP (ref 3.8–10.5)

## 2023-09-06 PROCEDURE — 99233 SBSQ HOSP IP/OBS HIGH 50: CPT

## 2023-09-06 RX ORDER — ONDANSETRON 8 MG/1
4 TABLET, FILM COATED ORAL EVERY 6 HOURS
Refills: 0 | Status: DISCONTINUED | OUTPATIENT
Start: 2023-09-06 | End: 2023-09-07

## 2023-09-06 RX ADMIN — SODIUM CHLORIDE 100 MILLILITER(S): 9 INJECTION, SOLUTION INTRAVENOUS at 04:30

## 2023-09-06 RX ADMIN — CYCLOSPORINE 1 DROP(S): 0.5 EMULSION OPHTHALMIC at 18:09

## 2023-09-06 RX ADMIN — LEVETIRACETAM 750 MILLIGRAM(S): 250 TABLET, FILM COATED ORAL at 17:43

## 2023-09-06 RX ADMIN — CEFTRIAXONE 100 MILLIGRAM(S): 500 INJECTION, POWDER, FOR SOLUTION INTRAMUSCULAR; INTRAVENOUS at 07:00

## 2023-09-06 RX ADMIN — LEVETIRACETAM 750 MILLIGRAM(S): 250 TABLET, FILM COATED ORAL at 06:41

## 2023-09-06 RX ADMIN — ENOXAPARIN SODIUM 40 MILLIGRAM(S): 100 INJECTION SUBCUTANEOUS at 17:42

## 2023-09-06 RX ADMIN — CYCLOSPORINE 1 DROP(S): 0.5 EMULSION OPHTHALMIC at 06:41

## 2023-09-06 NOTE — PROGRESS NOTE ADULT - ASSESSMENT
32F developmental delay (NV bl), seizure d/o (generalized, myoclonic, followed by neuro Dr. Correa), L corneal damage (due to rubbing eye, followed by ophtho), admit 6/2015 for persistent n/v iso UTI/abx, 11/2022 for n/v resolved soon after admit now p/w n/v x 1 day, found to have UA c/f UTI and prominent cluster of right lower quadrant nodes of unclear etiology on CTAP IVC; pt sx improving in ED

## 2023-09-07 ENCOUNTER — TRANSCRIPTION ENCOUNTER (OUTPATIENT)
Age: 33
End: 2023-09-07

## 2023-09-07 ENCOUNTER — NON-APPOINTMENT (OUTPATIENT)
Age: 33
End: 2023-09-07

## 2023-09-07 VITALS
OXYGEN SATURATION: 97 % | SYSTOLIC BLOOD PRESSURE: 101 MMHG | HEART RATE: 73 BPM | RESPIRATION RATE: 18 BRPM | TEMPERATURE: 98 F | DIASTOLIC BLOOD PRESSURE: 64 MMHG

## 2023-09-07 LAB
ALBUMIN SERPL ELPH-MCNC: 3.9 G/DL — SIGNIFICANT CHANGE UP (ref 3.3–5)
ALP SERPL-CCNC: 80 U/L — SIGNIFICANT CHANGE UP (ref 40–120)
ALT FLD-CCNC: 26 U/L — SIGNIFICANT CHANGE UP (ref 10–45)
ANION GAP SERPL CALC-SCNC: 14 MMOL/L — SIGNIFICANT CHANGE UP (ref 5–17)
AST SERPL-CCNC: 25 U/L — SIGNIFICANT CHANGE UP (ref 10–40)
BILIRUB SERPL-MCNC: 0.4 MG/DL — SIGNIFICANT CHANGE UP (ref 0.2–1.2)
BUN SERPL-MCNC: 17 MG/DL — SIGNIFICANT CHANGE UP (ref 7–23)
CALCIUM SERPL-MCNC: 9.4 MG/DL — SIGNIFICANT CHANGE UP (ref 8.4–10.5)
CHLORIDE SERPL-SCNC: 104 MMOL/L — SIGNIFICANT CHANGE UP (ref 96–108)
CO2 SERPL-SCNC: 23 MMOL/L — SIGNIFICANT CHANGE UP (ref 22–31)
CREAT SERPL-MCNC: 0.52 MG/DL — SIGNIFICANT CHANGE UP (ref 0.5–1.3)
EGFR: 126 ML/MIN/1.73M2 — SIGNIFICANT CHANGE UP
GLUCOSE SERPL-MCNC: 86 MG/DL — SIGNIFICANT CHANGE UP (ref 70–99)
POTASSIUM SERPL-MCNC: 4.3 MMOL/L — SIGNIFICANT CHANGE UP (ref 3.5–5.3)
POTASSIUM SERPL-SCNC: 4.3 MMOL/L — SIGNIFICANT CHANGE UP (ref 3.5–5.3)
PROT SERPL-MCNC: 7.6 G/DL — SIGNIFICANT CHANGE UP (ref 6–8.3)
SODIUM SERPL-SCNC: 141 MMOL/L — SIGNIFICANT CHANGE UP (ref 135–145)

## 2023-09-07 PROCEDURE — 99239 HOSP IP/OBS DSCHRG MGMT >30: CPT

## 2023-09-07 RX ORDER — CEFDINIR 250 MG/5ML
10 POWDER, FOR SUSPENSION ORAL
Qty: 1 | Refills: 0
Start: 2023-09-07 | End: 2023-09-09

## 2023-09-07 RX ORDER — FAMOTIDINE 10 MG/ML
20 INJECTION INTRAVENOUS
Refills: 0 | Status: DISCONTINUED | OUTPATIENT
Start: 2023-09-07 | End: 2023-09-07

## 2023-09-07 RX ORDER — FAMOTIDINE 10 MG/ML
1 INJECTION INTRAVENOUS
Qty: 60 | Refills: 0
Start: 2023-09-07 | End: 2023-10-06

## 2023-09-07 RX ADMIN — LEVETIRACETAM 750 MILLIGRAM(S): 250 TABLET, FILM COATED ORAL at 05:53

## 2023-09-07 RX ADMIN — CEFTRIAXONE 100 MILLIGRAM(S): 500 INJECTION, POWDER, FOR SOLUTION INTRAMUSCULAR; INTRAVENOUS at 05:52

## 2023-09-07 RX ADMIN — CYCLOSPORINE 1 DROP(S): 0.5 EMULSION OPHTHALMIC at 05:53

## 2023-09-07 NOTE — PROGRESS NOTE ADULT - PROBLEM SELECTOR PLAN 2
Had n/v x 1 day, hx of admit for same 2022 which resolved soon after w/o intervention and 2015 which was assc w/ UTI/abx  - CTAP IVC prominent cluster of right lower quadrant nodes of unclear etiology, mildly elevated   - RVP/COV swab neg, ahd recent international travel ([place]), conjunctival injection  - advised to have outpatient repeat CT abd/pelvis after treatment of UTI in a month  ** spoke to family member at bedside
Had n/v x 1 day, hx of admit for same 2022 which resolved soon after w/o intervention and 2015 which was assc w/ UTI/abx  - CTAP IVC prominent cluster of right lower quadrant nodes of unclear etiology  - RVP/COV swab neg, ahd recent international travel ([place]), conjunctival injection  - if sx not continuing to improve, would consider MR abd for further characterization of abn CT findings, otherwise can consider oupt f/u  - aspiration precaution  ** spoke to family member at bedside

## 2023-09-07 NOTE — PHYSICAL THERAPY INITIAL EVALUATION ADULT - IMPAIRMENTS FOUND, PT EVAL
at baseline with assist from mother and home attendant as needed/cognitive impairment/gait, locomotion, and balance/muscle strength

## 2023-09-07 NOTE — DISCHARGE NOTE NURSING/CASE MANAGEMENT/SOCIAL WORK - NSDCPEFALRISK_GEN_ALL_CORE
For information on Fall & Injury Prevention, visit: [email or web address] OR  [email or web address] OR  https://www.cdc.gov/steadi/patient.html

## 2023-09-07 NOTE — DISCHARGE NOTE PROVIDER - NSDCCPCAREPLAN_GEN_ALL_CORE_FT
PRINCIPAL DISCHARGE DIAGNOSIS  Diagnosis: Nausea & vomiting  Assessment and Plan of Treatment: resolved .  Nausea & vomiting.   ·  Plan: Had n/v x 1 day, hx of admit for same 2022 which resolved soon after w/o intervention and 2015 which was assc w/ UTI/abx  - CTAP IVC prominent cluster of right lower quadrant nodes of unclear etiology, mildly elevated   - advised to have outpatient repeat CT abd/pelvis after treatment of UTI in a month  ** spoke to family member at bedside.        SECONDARY DISCHARGE DIAGNOSES  Diagnosis: Seizure disorder  Assessment and Plan of Treatment: c/w keppra    Diagnosis: Urinary tract infection  Assessment and Plan of Treatment: HOME CARE INSTRUCTIONS  f you were prescribed antibiotics, take them exactly as your caregiver instructs you. Finish the medication even if you feel better after you have only taken some of the medication.  Drink enough water and fluids to keep your urine clear or pale yellow.  Avoid caffeine, tea, and carbonated beverages. They tend to irritate your bladder.  Empty your bladder often. Avoid holding urine for long periods of time.  SEEK MEDICAL CARE IF:  You have back pain.  You develop a fever.  Your symptoms do not begin to resolve within 3 days.  SEEK IMMEDIATE MEDICAL CARE IF:  You have severe back pain or lower abdominal pain.  You develop chills.  You have nausea or vomiting.  You have continued burning or discomfort with urination.       PRINCIPAL DISCHARGE DIAGNOSIS  Diagnosis: Nausea & vomiting  Assessment and Plan of Treatment: resolved .  Nausea & vomiting.   ·  Plan: Had n/v x 1 day, hx of admit for same 2022 which resolved soon after w/o intervention and 2015 which was assc w/ UTI/abx  - CTAP IVC prominent cluster of right lower quadrant nodes of unclear etiology, mildly elevated   - advised to have outpatient repeat CT abd/pelvis after treatment of UTI in a month        SECONDARY DISCHARGE DIAGNOSES  Diagnosis: Urinary tract infection  Assessment and Plan of Treatment: HOME CARE INSTRUCTIONS  f you were prescribed antibiotics, take them exactly as your caregiver instructs you. Finish the medication even if you feel better after you have only taken some of the medication.  Drink enough water and fluids to keep your urine clear or pale yellow.  Avoid caffeine, tea, and carbonated beverages. They tend to irritate your bladder.  Empty your bladder often. Avoid holding urine for long periods of time.  SEEK MEDICAL CARE IF:  You have back pain.  You develop a fever.  Your symptoms do not begin to resolve within 3 days.  SEEK IMMEDIATE MEDICAL CARE IF:  You have severe back pain or lower abdominal pain.  You develop chills.  You have nausea or vomiting.  You have continued burning or discomfort with urination.      Diagnosis: Seizure disorder  Assessment and Plan of Treatment: c/w keppra

## 2023-09-07 NOTE — DISCHARGE NOTE PROVIDER - NSDCFUADDAPPT_GEN_ALL_CORE_FT
APPTS ARE READY TO BE MADE: [ ] YES    Best Family or Patient Contact (if needed):    Additional Information about above appointments (if needed):    1: PCP  2: Neurology  3:     Other comments or requests:          CTAP IVC prominent cluster of right lower quadrant nodes of unclear etiology,   - advised to have outpatient repeat CT abd/pelvis after treatment of UTI in a month  ** spoke to family member at bedside.

## 2023-09-07 NOTE — DISCHARGE NOTE PROVIDER - PROVIDER TOKENS
PROVIDER:[TOKEN:[[id number]],FOLLOWUP:[1 week]],PROVIDER:[TOKEN:[[id number]],FOLLOWUP:[1 week]]

## 2023-09-07 NOTE — DISCHARGE NOTE PROVIDER - HOSPITAL COURSE
HPI:  32F developmental delay (NV bl), seizure d/o (generalized, myoclonic, followed by neuro Dr. Correa), L corneal damage (due to rubbing eye, followed by ophtho), admit 6/2015 for persistent n/v iso UTI/abx, 11/2022 for n/v resolved soon after admit, now p/w n/v x 1 day. Pt mother Lydia at bedside, along w/ Devante who is a family member/caretaker, who provide collateral as pt unable to participate in interview. Relay [facility] health when 9/4 began to experience nausea w/ multiple episodes (approx 20) of NBNB emesis, indicating toward her abd which they interpret as pain, felt warm though tmax at home 99F, assc poor po intake, prompting presentation to ED. While in ED she has urinary frequency w/o dysuria/hematuria/other change in appearance/malodor, and has not vomited since 22:00, w/ improved nausea, otherwise not endorsing noticed HA, chills, visual disturbance, rhinorrhea, odynophagia, dysphagia, CP, palpitations, SOB, cough, diarrhea, melena/hematochezia, or other complaint.       Hospital Course:32F developmental delay (NV bl), seizure d/o (generalized, myoclonic, followed by neuro Dr. Correa), L corneal damage (due to rubbing eye, followed by ophtho), admit 6/2015 for persistent n/v iso UTI/abx, 11/2022 for n/v resolved soon after ;admit now p/w n/v x 1 day, found to have UA c/f UTI and prominent cluster of right lower quadrant nodes of unclear etiology. Patient had CTAP with contrast noted CTAP IVC prominent cluster of right lower quadrant nodes of unclear etiology, mildly elevated   - RVP/COV swab neg, ahd recent international travel ([place]), conjunctival injection  - advised to have outpatient repeat CT abd/pelvis after treatment of UTI in a month spoke to family member at bedside.   Patient noted with UTI , UA was positive, emprically treated with rocephin;Urine c/s neg , patient symptoms improved and will  d/c home on Omnicef 300mg/10ml po bid for 3 more days ( total 5 days) He Had n/v x 1 day, hx of admit for same 2022 which resolved soon after w/o intervention and 2015 which was assc w/ UTI/abx  RVP/COV swab neg, hadrecent international travel ([place]), conjunctival injection  Patient with Developmental delay , has supportive family . History of seizure precautions ; stable with keppra             Important Medication Changes and Reason:    Active or Pending Issues Requiring Follow-up:  Repeat ct scan abdomen and pelvis in 1 month   Advanced Directives:   [ ] Full code  [ ] DNR  [ ] Hospice    Discharge Diagnoses:  UTI  Seizure  Abnormal finding in ct scan needs follow up           HPI:  32F developmental delay (NV bl), seizure d/o (generalized, myoclonic, followed by neuro Dr. Correa), L corneal damage (due to rubbing eye, followed by ophtho), admit 6/2015 for persistent n/v iso UTI/abx, 11/2022 for n/v resolved soon after admit, now p/w n/v x 1 day. Pt mother Lydia at bedside, along w/ Devante who is a family member/caretaker, who provide collateral as pt unable to participate in interview. [facility] health when 9/4 began to experience nausea w/ multiple episodes (approx 20) of NBNB emesis, indicating toward her abd which they interpret as pain, felt warm though tmax at home 99F, assc poor po intake, prompting presentation to ED. While in ED she has urinary frequency w/o dysuria/hematuria/other change in appearance/malodor, and has not vomited since 22:00, w/ improved nausea, otherwise not endorsing noticed HA, chills, visual disturbance, rhinorrhea, odynophagia, dysphagia, CP, palpitations, SOB, cough, diarrhea, melena/hematochezia, or other complaint.       Hospital Course:32F developmental delay (NV bl), seizure d/o (generalized, myoclonic, followed by neuro Dr. Correa), L corneal damage (due to rubbing eye, followed by ophtho), admit 6/2015 for persistent n/v iso UTI/abx, 11/2022 for n/v resolved soon after ;admit now p/w n/v x 1 day, found to have UA c/f UTI and prominent cluster of right lower quadrant nodes of unclear etiology. Patient had CTAP with contrast noted CTAP IVC prominent cluster of right lower quadrant nodes of unclear etiology, mildly elevated   - RVP/COV swab neg, ahd recent international travel ([place]), conjunctival injection  - advised to have outpatient repeat CT abd/pelvis after treatment of UTI in a month spoke to family member at bedside.   Patient noted with UTI , UA was positive, emprically treated with rocephin;Urine c/s neg , patient symptoms improved and will  d/c home on Omnicef 300mg/10ml po bid for 3 more days ( total 5 days) He Had n/v x 1 day, hx of admit for same 2022 which resolved soon after w/o intervention and 2015 which was assc w/ UTI/abx  RVP/COV swab neg, hadrecent international travel ([place]), conjunctival injection  Patient with Developmental delay , has supportive family . History of seizure precautions ; stable with keppra       Important Medication Changes and Reason:  Omnicef for 3 more days     Active or Pending Issues Requiring Follow-up:  Repeat ct scan abdomen and pelvis in 1 month     Advanced Directives:   [ x] Full code  [ ] DNR  [ ] Hospice    Discharge Diagnoses:  UTI  Seizure  Abnormal finding in ct scan needs follow up         HPI:  The patient is a 32F developmental delay (NV bl), seizure d/o (generalized, myoclonic, followed by neuro Dr. Correa), L corneal damage (due to rubbing eye, followed by ophtho), admit 6/2015 for persistent n/v iso UTI/abx, 11/2022 for n/v resolved soon after admit, now p/w n/v x 1 day. Pt mother Lydia at bedside, along w/ Devante who is a family member/caretaker, who provide collateral as pt unable to participate in interview. [facility] when 9/4 began to experience nausea w/ multiple episodes (approx 20) of NBNB emesis, indicating toward her abd which they interpret as pain, felt warm though tmax at home 99F, assc poor po intake, prompting presentation to ED. While in ED she has urinary frequency w/o dysuria/hematuria/other change in appearance/malodor, and has not vomited since 22:00, w/ improved nausea, otherwise not endorsing noticed HA, chills, visual disturbance, rhinorrhea, odynophagia, dysphagia, CP, palpitations, SOB, cough, diarrhea, melena/hematochezia, or other complaint.       Hospital Course:  The patient was found to have UTI and started IV antibiotic, Ceftriaxone. There was prominent cluster of right lower quadrant nodes of unclear etiology on CT abd/pelvis. She did not have sepsis. RVP/COV swab neg, and recent international travel ([place]), conjunctival injection. Urine c/s neg. She remained afebrile, tolerating meals. RVP/COV swab neg, had recent international travel ([place]), conjunctival injection  Patient with Developmental delay , has supportive family . History of seizure precautions ; stable with keppra at home doses.    Important Medication Changes and Reason:  Omnicef 10ml bid for 3 more days     Active or Pending Issues Requiring Follow-up:  Repeat ct scan abdomen and pelvis in 1 month by PCP for to see of resolution of cluster of nodes at RLQ    Advanced Directives:   [ x] Full code  [ ] DNR  [ ] Hospice    Discharge Diagnoses:  UTI, no sepsis  Abnormal finding in ct scan needs follow up  Seizure disorder  Development delay

## 2023-09-07 NOTE — DISCHARGE NOTE PROVIDER - NSDCMRMEDTOKEN_GEN_ALL_CORE_FT
Keppra 100 mg/mL oral solution: 7.5 milliliter(s) orally 2 times a day  Multiple Vitamins oral tablet: 1 tab(s) orally once a day  Restasis 0.05% ophthalmic emulsion: 1 drop(s) in each affected eye 2 times a day   cefdinir 250 mg/5 mL oral liquid: 10 milliliter(s) orally 2 times a day  famotidine 20 mg oral tablet: 1 tab(s) orally 2 times a day  Keppra 100 mg/mL oral solution: 7.5 milliliter(s) orally 2 times a day  Multiple Vitamins oral tablet: 1 tab(s) orally once a day  Restasis 0.05% ophthalmic emulsion: 1 drop(s) in each affected eye 2 times a day

## 2023-09-07 NOTE — PROGRESS NOTE ADULT - PROBLEM SELECTOR PLAN 1
afebrile, WBC wnl, UA positive for UTI, no sepsis  - empiric tx w/ IV CTX 1gm daily,   - Urine c/s neg   - d/c home on Omnicef 300mg/10ml po bid for 3 more days ( total 5 days)  - d/c time 42 min
afebrile, WBC wnl, UA positive for UTI, no sepsis  - empiric tx w/ IV CTX 1gm daily, will f/u UCx   - monitor fever

## 2023-09-07 NOTE — PHYSICAL THERAPY INITIAL EVALUATION ADULT - ADDITIONAL COMMENTS
Per patient's mother (patient is non-verbal), patient lives with her mother and home attendant in a house where there are 3-4 steps to enter with bilateral hand rails and once inside is a full flight of stairs to her bedroom with a handrail. Patient's mother states patient was ambulating independently without a device prior to her hospital admission (mother or attendant at side for community distances) and required assistance for ADL's. Patient does not own any DME.

## 2023-09-07 NOTE — DISCHARGE NOTE PROVIDER - CARE PROVIDERS DIRECT ADDRESSES
[email or web address]

## 2023-09-07 NOTE — PROGRESS NOTE ADULT - PROBLEM SELECTOR PLAN 5
DVT ppx: lovenox subq inpatient
Chemical DVT ppx: lovenox subq   Diet: advance as tolerates  Precautions: fall/aspiration/seizure as above   Dispo: pending course, PT recs (c/s placed)

## 2023-09-07 NOTE — DISCHARGE NOTE NURSING/CASE MANAGEMENT/SOCIAL WORK - PATIENT PORTAL LINK FT
You can access the FollowMyHealth Patient Portal offered by [facility] by registering at the following website: [email or web address]. By joining "[employer]’s FollowMyHealth portal, you will also be able to view your health information using other applications (apps) compatible with our system.

## 2023-09-07 NOTE — PROGRESS NOTE ADULT - PROBLEM SELECTOR PLAN 3
NV bl, family assists  - fall precaution  - family made aware to inform RN if they leave bedside for any reason, as pt requires supervision
Has home aide and family helps  - fall precaution  - c/w home care

## 2023-09-07 NOTE — PROGRESS NOTE ADULT - PROBLEM SELECTOR PLAN 4
- c/w home Keppra   - caution re seizure threshold lowering meds  - seizure precautions
no seizure noted  - c/w home Keppra

## 2023-09-07 NOTE — PROGRESS NOTE ADULT - SUBJECTIVE AND OBJECTIVE BOX
Mohsin Khan, MD  Attending Physician, Division Of Hospital Medicine  Pager: [phone], Office: [phone]  Off hour pager: [phone]    Patient is a 32y old  Female who presents with a chief complaint of Nausea/Vomiting     SUBJECTIVE / OVERNIGHT EVENTS:  Sitting in bed, awake, delay in speaking- known h/o, afebrile, no N/V, had breakfast, BP is stable    MEDICATIONS  (STANDING):  cefTRIAXone   IVPB      cefTRIAXone   IVPB 1000 milliGRAM(s) IV Intermittent every 24 hours  cycloSPORINE (RESTASIS) 0.05% Emulsion 1 Drop(s) Both EYES two times a day  enoxaparin Injectable 40 milliGRAM(s) SubCutaneous every 24 hours  levETIRAcetam  Solution 750 milliGRAM(s) Oral two times a day    MEDICATIONS  (PRN):  acetaminophen     Tablet .. 650 milliGRAM(s) Oral every 6 hours PRN Temp greater or equal to 38C (100.4F), Mild Pain (1 - 3)      Vital Signs Last 24 Hrs  T(C): 36.9 (06 Sep 2023 04:09), Max: 37 (05 Sep 2023 17:08)  T(F): 98.4 (06 Sep 2023 04:09), Max: 98.6 (05 Sep 2023 17:08)  HR: 84 (06 Sep 2023 04:09) (84 - 103)  BP: 114/76 (06 Sep 2023 04:09) (100/63 - 115/75)  BP(mean): --  RR: 17 (06 Sep 2023 04:09) (17 - 18)  SpO2: 96% (06 Sep 2023 04:09) (94% - 98%)    Parameters below as of 06 Sep 2023 04:09  Patient On (Oxygen Delivery Method): room air      CAPILLARY BLOOD GLUCOSE        I&O's Summary    05 Sep 2023 07:01  -  06 Sep 2023 07:00  --------------------------------------------------------  IN: 120 mL / OUT: 0 mL / NET: 120 mL        PHYSICAL EXAM:-  GENERAL: NAD, awake, no distress, delay in response from MR  EYES: EOMI, PERRLA, conjunctiva and sclera clear  NECK: Supple, No JVD, no thyromegaly  CHEST/LUNG: Clear to auscultation bilaterally; No wheeze  HEART: Regular rate and rhythm; S1, S2 audible, No murmurs, rubs, or gallops  ABDOMEN: Soft, Nontender, Nondistended; Bowel sounds present  EXTREMITIES:  2+ Peripheral Pulses, No clubbing, cyanosis, or edema  NEURO: AA, not oriented for MR, move all ext    LABS:                        12.4   10.08 )-----------( 175      ( 06 Sep 2023 07:13 )             38.8     09-06    139  |  104  |  12  ----------------------------<  76  4.2   |  23  |  0.53    Ca    8.7      06 Sep 2023 07:11  Phos  2.5     09-06  Mg     2.2     09-06    TPro  7.3  /  Alb  4.1  /  TBili  0.4  /  DBili  x   /  AST  27  /  ALT  22  /  AlkPhos  69  09-05      CARDIAC MARKERS ( 06 Sep 2023 07:11 )  x     / x     / 308 U/L / x     / x      CARDIAC MARKERS ( 05 Sep 2023 07:32 )  x     / x     / 309 U/L / x     / 5.4 ng/mL      Urinalysis Basic - ( 06 Sep 2023 07:11 )    Color: x / Appearance: x / SG: x / pH: x  Gluc: 76 mg/dL / Ketone: x  / Bili: x / Urobili: x   Blood: x / Protein: x / Nitrite: x   Leuk Esterase: x / RBC: x / WBC x   Sq Epi: x / Non Sq Epi: x / Bacteria: x        RADIOLOGY & ADDITIONAL TESTS:    Imaging Personally Reviewed: CT abd/pelvis, CXR    
Mohsin Khan, MD  Attending Physician, Division Of Hospital Medicine  Pager: [phone], Office: [phone]  Off hour pager: [phone]    Patient is a 32y old  Female who presents with a chief complaint of Nausea/Vomiting     SUBJECTIVE / OVERNIGHT EVENTS:  Seen, examined the patient this am  Feels ok, no fever since admission, no N/V,  Tmax 98.4F, VSS    MEDICATIONS  (STANDING):  cefTRIAXone   IVPB 1000 milliGRAM(s) IV Intermittent every 24 hours  cefTRIAXone   IVPB      cycloSPORINE (RESTASIS) 0.05% Emulsion 1 Drop(s) Both EYES two times a day  enoxaparin Injectable 40 milliGRAM(s) SubCutaneous every 24 hours  famotidine    Tablet 20 milliGRAM(s) Oral two times a day  levETIRAcetam  Solution 750 milliGRAM(s) Oral two times a day    MEDICATIONS  (PRN):  acetaminophen     Tablet .. 650 milliGRAM(s) Oral every 6 hours PRN Temp greater or equal to 38C (100.4F), Mild Pain (1 - 3)  ondansetron Injectable 4 milliGRAM(s) IV Push every 6 hours PRN Nausea and/or Vomiting      Vital Signs Last 24 Hrs  T(C): 36.3 (07 Sep 2023 04:22), Max: 36.8 (06 Sep 2023 23:46)  T(F): 97.4 (07 Sep 2023 04:22), Max: 98.2 (06 Sep 2023 23:46)  HR: 78 (07 Sep 2023 09:00) (78 - 85)  BP: 97/68 (07 Sep 2023 09:00) (97/68 - 135/81)  BP(mean): --  RR: 18 (07 Sep 2023 04:22) (18 - 18)  SpO2: 97% (07 Sep 2023 09:00) (97% - 99%)    Parameters below as of 07 Sep 2023 09:00  Patient On (Oxygen Delivery Method): room air      CAPILLARY BLOOD GLUCOSE        I&O's Summary    06 Sep 2023 07:01  -  07 Sep 2023 07:00  --------------------------------------------------------  IN: 630 mL / OUT: 0 mL / NET: 630 mL        PHYSICAL EXAM:-  GENERAL: NAD, well-developed, nonverbal from   EYES: EOMI, PERRLA, conjunctiva and sclera clear  NECK: Supple, No JVD, no thyromegaly  CHEST/LUNG: Clear to auscultation bilaterally; No wheeze  HEART: Regular rate and rhythm; S1, S2 audible, No murmurs, rubs, or gallops  ABDOMEN: Soft, Nontender, Nondistended; Bowel sounds present  EXTREMITIES:  2+ Peripheral Pulses, No clubbing, cyanosis, or edema  NEURO: AA, nonverbal from MR, smiles, shakes hands      LABS:                        12.4   10.08 )-----------( 175      ( 06 Sep 2023 07:13 )             38.8     09-07    141  |  104  |  17  ----------------------------<  86  4.3   |  23  |  0.52    Ca    9.4      07 Sep 2023 07:02  Phos  2.5     09-06  Mg     2.2     09-06    TPro  7.6  /  Alb  3.9  /  TBili  0.4  /  DBili  x   /  AST  25  /  ALT  26  /  AlkPhos  80  09-07      CARDIAC MARKERS ( 06 Sep 2023 07:11 )  x     / x     / 308 U/L / x     / x          Urinalysis Basic - ( 07 Sep 2023 07:02 )    Color: x / Appearance: x / SG: x / pH: x  Gluc: 86 mg/dL / Ketone: x  / Bili: x / Urobili: x   Blood: x / Protein: x / Nitrite: x   Leuk Esterase: x / RBC: x / WBC x   Sq Epi: x / Non Sq Epi: x / Bacteria: x        RADIOLOGY & ADDITIONAL TESTS:    Imaging Personally Reviewed: CT a/p, CXR

## 2023-09-07 NOTE — PHYSICAL THERAPY INITIAL EVALUATION ADULT - PERTINENT HX OF CURRENT PROBLEM, REHAB EVAL
32F developmental delay (NV bl), seizure d/o (generalized, myoclonic, followed by neuro Dr. Correa), L corneal damage (due to rubbing eye, followed by ophtho), admit 6/2015 for persistent n/v iso UTI/abx, 11/2022 for n/v resolved soon after admit, now p/w n/v x 1 day. Pt mother Lydia at bedside, along w/ Devante who is a family member/caretaker, who provide collateral as pt unable to participate in interview. [facility]  health when 9/4 began to experience nausea w/ multiple episodes (approx 20) of NBNB emesis, indicating toward her abd which they interpret as pain, felt warm though tmax at home 99F, assc poor po intake, prompting presentation to ED. While in ED she has urinary frequency w/o dysuria/hematuria/other change in appearance/malodor, and has not vomited since 22:00, w/ improved nausea, otherwise not endorsing noticed HA, chills, visual disturbance, rhinorrhea, odynophagia, dysphagia, CP, palpitations, SOB, cough, diarrhea, melena/hematochezia, or other complaint. Hospital course: chest x-ray 9/4- Clear lungs. No evidence of pneumoperitoneum; CT abdomen/pelvis 9/4- No bowel obstruction or evidence of acute bowel inflammation. Prominent cluster of right lower quadrant nodes of unclear etiology.

## 2023-09-09 ENCOUNTER — NON-APPOINTMENT (OUTPATIENT)
Age: 33
End: 2023-09-09

## 2023-09-28 PROBLEM — Z87.828 PERSONAL HISTORY OF OTHER (HEALED) PHYSICAL INJURY AND TRAUMA: Chronic | Status: ACTIVE | Noted: 2023-09-05

## 2023-10-23 ENCOUNTER — NON-APPOINTMENT (OUTPATIENT)
Age: 33
End: 2023-10-23

## 2023-11-13 PROCEDURE — 81001 URINALYSIS AUTO W/SCOPE: CPT

## 2023-11-13 PROCEDURE — 80053 COMPREHEN METABOLIC PANEL: CPT

## 2023-11-13 PROCEDURE — 83735 ASSAY OF MAGNESIUM: CPT

## 2023-11-13 PROCEDURE — 85014 HEMATOCRIT: CPT

## 2023-11-13 PROCEDURE — 0225U NFCT DS DNA&RNA 21 SARSCOV2: CPT

## 2023-11-13 PROCEDURE — 82947 ASSAY GLUCOSE BLOOD QUANT: CPT

## 2023-11-13 PROCEDURE — G1004: CPT

## 2023-11-13 PROCEDURE — 96374 THER/PROPH/DIAG INJ IV PUSH: CPT

## 2023-11-13 PROCEDURE — 83605 ASSAY OF LACTIC ACID: CPT

## 2023-11-13 PROCEDURE — 82803 BLOOD GASES ANY COMBINATION: CPT

## 2023-11-13 PROCEDURE — 85025 COMPLETE CBC W/AUTO DIFF WBC: CPT

## 2023-11-13 PROCEDURE — 83615 LACTATE (LD) (LDH) ENZYME: CPT

## 2023-11-13 PROCEDURE — 85027 COMPLETE CBC AUTOMATED: CPT

## 2023-11-13 PROCEDURE — 36415 COLL VENOUS BLD VENIPUNCTURE: CPT

## 2023-11-13 PROCEDURE — 71045 X-RAY EXAM CHEST 1 VIEW: CPT

## 2023-11-13 PROCEDURE — 84484 ASSAY OF TROPONIN QUANT: CPT

## 2023-11-13 PROCEDURE — 82330 ASSAY OF CALCIUM: CPT

## 2023-11-13 PROCEDURE — 82553 CREATINE MB FRACTION: CPT

## 2023-11-13 PROCEDURE — 97161 PT EVAL LOW COMPLEX 20 MIN: CPT

## 2023-11-13 PROCEDURE — 84295 ASSAY OF SERUM SODIUM: CPT

## 2023-11-13 PROCEDURE — 84100 ASSAY OF PHOSPHORUS: CPT

## 2023-11-13 PROCEDURE — 74177 CT ABD & PELVIS W/CONTRAST: CPT | Mod: MG

## 2023-11-13 PROCEDURE — 96375 TX/PRO/DX INJ NEW DRUG ADDON: CPT

## 2023-11-13 PROCEDURE — 99285 EMERGENCY DEPT VISIT HI MDM: CPT

## 2023-11-13 PROCEDURE — 80048 BASIC METABOLIC PNL TOTAL CA: CPT

## 2023-11-13 PROCEDURE — 87086 URINE CULTURE/COLONY COUNT: CPT

## 2023-11-13 PROCEDURE — 84132 ASSAY OF SERUM POTASSIUM: CPT

## 2023-11-13 PROCEDURE — 82550 ASSAY OF CK (CPK): CPT

## 2023-11-13 PROCEDURE — 82435 ASSAY OF BLOOD CHLORIDE: CPT

## 2023-11-13 PROCEDURE — 83690 ASSAY OF LIPASE: CPT

## 2023-11-13 PROCEDURE — 85018 HEMOGLOBIN: CPT

## 2024-01-05 ENCOUNTER — INPATIENT (INPATIENT)
Facility: HOSPITAL | Age: 34
LOS: 2 days | Discharge: HOME | DRG: 153 | End: 2024-01-08
Attending: INTERNAL MEDICINE | Admitting: STUDENT IN AN ORGANIZED HEALTH CARE EDUCATION/TRAINING PROGRAM
Payer: MEDICARE

## 2024-01-05 VITALS
HEART RATE: 71 BPM | SYSTOLIC BLOOD PRESSURE: 133 MMHG | TEMPERATURE: 97 F | WEIGHT: 145.06 LBS | RESPIRATION RATE: 20 BRPM | OXYGEN SATURATION: 99 % | DIASTOLIC BLOOD PRESSURE: 90 MMHG | HEIGHT: 63 IN

## 2024-01-05 DIAGNOSIS — Z98.890 OTHER SPECIFIED POSTPROCEDURAL STATES: Chronic | ICD-10-CM

## 2024-01-05 DIAGNOSIS — B33.8 OTHER SPECIFIED VIRAL DISEASES: ICD-10-CM

## 2024-01-05 LAB
ALBUMIN SERPL ELPH-MCNC: 4.4 G/DL — SIGNIFICANT CHANGE UP (ref 3.3–5)
ALBUMIN SERPL ELPH-MCNC: 4.4 G/DL — SIGNIFICANT CHANGE UP (ref 3.3–5)
ALP SERPL-CCNC: 104 U/L — SIGNIFICANT CHANGE UP (ref 40–120)
ALP SERPL-CCNC: 104 U/L — SIGNIFICANT CHANGE UP (ref 40–120)
ALT FLD-CCNC: 28 U/L — SIGNIFICANT CHANGE UP (ref 10–45)
ALT FLD-CCNC: 28 U/L — SIGNIFICANT CHANGE UP (ref 10–45)
ANION GAP SERPL CALC-SCNC: 16 MMOL/L — SIGNIFICANT CHANGE UP (ref 5–17)
ANION GAP SERPL CALC-SCNC: 16 MMOL/L — SIGNIFICANT CHANGE UP (ref 5–17)
APPEARANCE UR: CLEAR — SIGNIFICANT CHANGE UP
APPEARANCE UR: CLEAR — SIGNIFICANT CHANGE UP
AST SERPL-CCNC: 26 U/L — SIGNIFICANT CHANGE UP (ref 10–40)
AST SERPL-CCNC: 26 U/L — SIGNIFICANT CHANGE UP (ref 10–40)
BACTERIA # UR AUTO: ABNORMAL /HPF
BACTERIA # UR AUTO: ABNORMAL /HPF
BASE EXCESS BLDV CALC-SCNC: 2.1 MMOL/L — SIGNIFICANT CHANGE UP (ref -2–3)
BASE EXCESS BLDV CALC-SCNC: 2.1 MMOL/L — SIGNIFICANT CHANGE UP (ref -2–3)
BASOPHILS # BLD AUTO: 0 K/UL — SIGNIFICANT CHANGE UP (ref 0–0.2)
BASOPHILS # BLD AUTO: 0 K/UL — SIGNIFICANT CHANGE UP (ref 0–0.2)
BASOPHILS NFR BLD AUTO: 0 % — SIGNIFICANT CHANGE UP (ref 0–2)
BASOPHILS NFR BLD AUTO: 0 % — SIGNIFICANT CHANGE UP (ref 0–2)
BILIRUB SERPL-MCNC: 0.5 MG/DL — SIGNIFICANT CHANGE UP (ref 0.2–1.2)
BILIRUB SERPL-MCNC: 0.5 MG/DL — SIGNIFICANT CHANGE UP (ref 0.2–1.2)
BILIRUB UR-MCNC: NEGATIVE — SIGNIFICANT CHANGE UP
BILIRUB UR-MCNC: NEGATIVE — SIGNIFICANT CHANGE UP
BUN SERPL-MCNC: 10 MG/DL — SIGNIFICANT CHANGE UP (ref 7–23)
BUN SERPL-MCNC: 10 MG/DL — SIGNIFICANT CHANGE UP (ref 7–23)
CA-I SERPL-SCNC: 1.21 MMOL/L — SIGNIFICANT CHANGE UP (ref 1.15–1.33)
CA-I SERPL-SCNC: 1.21 MMOL/L — SIGNIFICANT CHANGE UP (ref 1.15–1.33)
CALCIUM SERPL-MCNC: 9.9 MG/DL — SIGNIFICANT CHANGE UP (ref 8.4–10.5)
CALCIUM SERPL-MCNC: 9.9 MG/DL — SIGNIFICANT CHANGE UP (ref 8.4–10.5)
CAST: 0 /LPF — SIGNIFICANT CHANGE UP (ref 0–4)
CAST: 0 /LPF — SIGNIFICANT CHANGE UP (ref 0–4)
CHLORIDE BLDV-SCNC: 102 MMOL/L — SIGNIFICANT CHANGE UP (ref 96–108)
CHLORIDE BLDV-SCNC: 102 MMOL/L — SIGNIFICANT CHANGE UP (ref 96–108)
CHLORIDE SERPL-SCNC: 101 MMOL/L — SIGNIFICANT CHANGE UP (ref 96–108)
CHLORIDE SERPL-SCNC: 101 MMOL/L — SIGNIFICANT CHANGE UP (ref 96–108)
CO2 BLDV-SCNC: 29 MMOL/L — HIGH (ref 22–26)
CO2 BLDV-SCNC: 29 MMOL/L — HIGH (ref 22–26)
CO2 SERPL-SCNC: 25 MMOL/L — SIGNIFICANT CHANGE UP (ref 22–31)
CO2 SERPL-SCNC: 25 MMOL/L — SIGNIFICANT CHANGE UP (ref 22–31)
COLOR SPEC: YELLOW — SIGNIFICANT CHANGE UP
COLOR SPEC: YELLOW — SIGNIFICANT CHANGE UP
CREAT SERPL-MCNC: 0.51 MG/DL — SIGNIFICANT CHANGE UP (ref 0.5–1.3)
CREAT SERPL-MCNC: 0.51 MG/DL — SIGNIFICANT CHANGE UP (ref 0.5–1.3)
DIFF PNL FLD: NEGATIVE — SIGNIFICANT CHANGE UP
DIFF PNL FLD: NEGATIVE — SIGNIFICANT CHANGE UP
EGFR: 126 ML/MIN/1.73M2 — SIGNIFICANT CHANGE UP
EGFR: 126 ML/MIN/1.73M2 — SIGNIFICANT CHANGE UP
EOSINOPHIL # BLD AUTO: 0 K/UL — SIGNIFICANT CHANGE UP (ref 0–0.5)
EOSINOPHIL # BLD AUTO: 0 K/UL — SIGNIFICANT CHANGE UP (ref 0–0.5)
EOSINOPHIL NFR BLD AUTO: 0 % — SIGNIFICANT CHANGE UP (ref 0–6)
EOSINOPHIL NFR BLD AUTO: 0 % — SIGNIFICANT CHANGE UP (ref 0–6)
FLUAV AG NPH QL: SIGNIFICANT CHANGE UP
FLUAV AG NPH QL: SIGNIFICANT CHANGE UP
FLUBV AG NPH QL: SIGNIFICANT CHANGE UP
FLUBV AG NPH QL: SIGNIFICANT CHANGE UP
GAS PNL BLDV: 136 MMOL/L — SIGNIFICANT CHANGE UP (ref 136–145)
GAS PNL BLDV: 136 MMOL/L — SIGNIFICANT CHANGE UP (ref 136–145)
GAS PNL BLDV: SIGNIFICANT CHANGE UP
GLUCOSE BLDV-MCNC: 126 MG/DL — HIGH (ref 70–99)
GLUCOSE BLDV-MCNC: 126 MG/DL — HIGH (ref 70–99)
GLUCOSE SERPL-MCNC: 117 MG/DL — HIGH (ref 70–99)
GLUCOSE SERPL-MCNC: 117 MG/DL — HIGH (ref 70–99)
GLUCOSE UR QL: NEGATIVE MG/DL — SIGNIFICANT CHANGE UP
GLUCOSE UR QL: NEGATIVE MG/DL — SIGNIFICANT CHANGE UP
HCG SERPL-ACNC: <2 MIU/ML — SIGNIFICANT CHANGE UP
HCG SERPL-ACNC: <2 MIU/ML — SIGNIFICANT CHANGE UP
HCO3 BLDV-SCNC: 28 MMOL/L — SIGNIFICANT CHANGE UP (ref 22–29)
HCO3 BLDV-SCNC: 28 MMOL/L — SIGNIFICANT CHANGE UP (ref 22–29)
HCT VFR BLD CALC: 46.4 % — HIGH (ref 34.5–45)
HCT VFR BLD CALC: 46.4 % — HIGH (ref 34.5–45)
HCT VFR BLDA CALC: 47 % — HIGH (ref 34.5–46.5)
HCT VFR BLDA CALC: 47 % — HIGH (ref 34.5–46.5)
HGB BLD CALC-MCNC: 15.5 G/DL — SIGNIFICANT CHANGE UP (ref 11.7–16.1)
HGB BLD CALC-MCNC: 15.5 G/DL — SIGNIFICANT CHANGE UP (ref 11.7–16.1)
HGB BLD-MCNC: 15.1 G/DL — SIGNIFICANT CHANGE UP (ref 11.5–15.5)
HGB BLD-MCNC: 15.1 G/DL — SIGNIFICANT CHANGE UP (ref 11.5–15.5)
KETONES UR-MCNC: 15 MG/DL
KETONES UR-MCNC: 15 MG/DL
LACTATE BLDV-MCNC: 2.7 MMOL/L — HIGH (ref 0.5–2)
LACTATE BLDV-MCNC: 2.7 MMOL/L — HIGH (ref 0.5–2)
LEUKOCYTE ESTERASE UR-ACNC: ABNORMAL
LEUKOCYTE ESTERASE UR-ACNC: ABNORMAL
LIDOCAIN IGE QN: 23 U/L — SIGNIFICANT CHANGE UP (ref 7–60)
LIDOCAIN IGE QN: 23 U/L — SIGNIFICANT CHANGE UP (ref 7–60)
LYMPHOCYTES # BLD AUTO: 0.4 K/UL — LOW (ref 1–3.3)
LYMPHOCYTES # BLD AUTO: 0.4 K/UL — LOW (ref 1–3.3)
LYMPHOCYTES # BLD AUTO: 2.6 % — LOW (ref 13–44)
LYMPHOCYTES # BLD AUTO: 2.6 % — LOW (ref 13–44)
MANUAL SMEAR VERIFICATION: SIGNIFICANT CHANGE UP
MANUAL SMEAR VERIFICATION: SIGNIFICANT CHANGE UP
MCHC RBC-ENTMCNC: 30 PG — SIGNIFICANT CHANGE UP (ref 27–34)
MCHC RBC-ENTMCNC: 30 PG — SIGNIFICANT CHANGE UP (ref 27–34)
MCHC RBC-ENTMCNC: 32.5 GM/DL — SIGNIFICANT CHANGE UP (ref 32–36)
MCHC RBC-ENTMCNC: 32.5 GM/DL — SIGNIFICANT CHANGE UP (ref 32–36)
MCV RBC AUTO: 92.1 FL — SIGNIFICANT CHANGE UP (ref 80–100)
MCV RBC AUTO: 92.1 FL — SIGNIFICANT CHANGE UP (ref 80–100)
MONOCYTES # BLD AUTO: 1.21 K/UL — HIGH (ref 0–0.9)
MONOCYTES # BLD AUTO: 1.21 K/UL — HIGH (ref 0–0.9)
MONOCYTES NFR BLD AUTO: 7.9 % — SIGNIFICANT CHANGE UP (ref 2–14)
MONOCYTES NFR BLD AUTO: 7.9 % — SIGNIFICANT CHANGE UP (ref 2–14)
NEUTROPHILS # BLD AUTO: 13.68 K/UL — HIGH (ref 1.8–7.4)
NEUTROPHILS # BLD AUTO: 13.68 K/UL — HIGH (ref 1.8–7.4)
NEUTROPHILS NFR BLD AUTO: 89.5 % — HIGH (ref 43–77)
NEUTROPHILS NFR BLD AUTO: 89.5 % — HIGH (ref 43–77)
NITRITE UR-MCNC: NEGATIVE — SIGNIFICANT CHANGE UP
NITRITE UR-MCNC: NEGATIVE — SIGNIFICANT CHANGE UP
PCO2 BLDV: 46 MMHG — HIGH (ref 39–42)
PCO2 BLDV: 46 MMHG — HIGH (ref 39–42)
PH BLDV: 7.39 — SIGNIFICANT CHANGE UP (ref 7.32–7.43)
PH BLDV: 7.39 — SIGNIFICANT CHANGE UP (ref 7.32–7.43)
PH UR: 7.5 — SIGNIFICANT CHANGE UP (ref 5–8)
PH UR: 7.5 — SIGNIFICANT CHANGE UP (ref 5–8)
PLAT MORPH BLD: NORMAL — SIGNIFICANT CHANGE UP
PLAT MORPH BLD: NORMAL — SIGNIFICANT CHANGE UP
PLATELET # BLD AUTO: 347 K/UL — SIGNIFICANT CHANGE UP (ref 150–400)
PLATELET # BLD AUTO: 347 K/UL — SIGNIFICANT CHANGE UP (ref 150–400)
PO2 BLDV: 58 MMHG — HIGH (ref 25–45)
PO2 BLDV: 58 MMHG — HIGH (ref 25–45)
POTASSIUM BLDV-SCNC: 4 MMOL/L — SIGNIFICANT CHANGE UP (ref 3.5–5.1)
POTASSIUM BLDV-SCNC: 4 MMOL/L — SIGNIFICANT CHANGE UP (ref 3.5–5.1)
POTASSIUM SERPL-MCNC: 4.1 MMOL/L — SIGNIFICANT CHANGE UP (ref 3.5–5.3)
POTASSIUM SERPL-MCNC: 4.1 MMOL/L — SIGNIFICANT CHANGE UP (ref 3.5–5.3)
POTASSIUM SERPL-SCNC: 4.1 MMOL/L — SIGNIFICANT CHANGE UP (ref 3.5–5.3)
POTASSIUM SERPL-SCNC: 4.1 MMOL/L — SIGNIFICANT CHANGE UP (ref 3.5–5.3)
PROT SERPL-MCNC: 8.8 G/DL — HIGH (ref 6–8.3)
PROT SERPL-MCNC: 8.8 G/DL — HIGH (ref 6–8.3)
PROT UR-MCNC: NEGATIVE MG/DL — SIGNIFICANT CHANGE UP
PROT UR-MCNC: NEGATIVE MG/DL — SIGNIFICANT CHANGE UP
RBC # BLD: 5.04 M/UL — SIGNIFICANT CHANGE UP (ref 3.8–5.2)
RBC # BLD: 5.04 M/UL — SIGNIFICANT CHANGE UP (ref 3.8–5.2)
RBC # FLD: 12.1 % — SIGNIFICANT CHANGE UP (ref 10.3–14.5)
RBC # FLD: 12.1 % — SIGNIFICANT CHANGE UP (ref 10.3–14.5)
RBC BLD AUTO: SIGNIFICANT CHANGE UP
RBC BLD AUTO: SIGNIFICANT CHANGE UP
RBC CASTS # UR COMP ASSIST: 1 /HPF — SIGNIFICANT CHANGE UP (ref 0–4)
RBC CASTS # UR COMP ASSIST: 1 /HPF — SIGNIFICANT CHANGE UP (ref 0–4)
RSV RNA NPH QL NAA+NON-PROBE: DETECTED
RSV RNA NPH QL NAA+NON-PROBE: DETECTED
SAO2 % BLDV: 88.8 % — HIGH (ref 67–88)
SAO2 % BLDV: 88.8 % — HIGH (ref 67–88)
SARS-COV-2 RNA SPEC QL NAA+PROBE: SIGNIFICANT CHANGE UP
SARS-COV-2 RNA SPEC QL NAA+PROBE: SIGNIFICANT CHANGE UP
SODIUM SERPL-SCNC: 142 MMOL/L — SIGNIFICANT CHANGE UP (ref 135–145)
SODIUM SERPL-SCNC: 142 MMOL/L — SIGNIFICANT CHANGE UP (ref 135–145)
SP GR SPEC: 1.01 — SIGNIFICANT CHANGE UP (ref 1–1.03)
SP GR SPEC: 1.01 — SIGNIFICANT CHANGE UP (ref 1–1.03)
SQUAMOUS # UR AUTO: 9 /HPF — HIGH (ref 0–5)
SQUAMOUS # UR AUTO: 9 /HPF — HIGH (ref 0–5)
UROBILINOGEN FLD QL: 0.2 MG/DL — SIGNIFICANT CHANGE UP (ref 0.2–1)
UROBILINOGEN FLD QL: 0.2 MG/DL — SIGNIFICANT CHANGE UP (ref 0.2–1)
WBC # BLD: 15.28 K/UL — HIGH (ref 3.8–10.5)
WBC # BLD: 15.28 K/UL — HIGH (ref 3.8–10.5)
WBC # FLD AUTO: 15.28 K/UL — HIGH (ref 3.8–10.5)
WBC # FLD AUTO: 15.28 K/UL — HIGH (ref 3.8–10.5)
WBC UR QL: 2 /HPF — SIGNIFICANT CHANGE UP (ref 0–5)
WBC UR QL: 2 /HPF — SIGNIFICANT CHANGE UP (ref 0–5)

## 2024-01-05 PROCEDURE — 71045 X-RAY EXAM CHEST 1 VIEW: CPT | Mod: 26

## 2024-01-05 PROCEDURE — 99223 1ST HOSP IP/OBS HIGH 75: CPT

## 2024-01-05 PROCEDURE — 99285 EMERGENCY DEPT VISIT HI MDM: CPT

## 2024-01-05 RX ORDER — ONDANSETRON 8 MG/1
4 TABLET, FILM COATED ORAL ONCE
Refills: 0 | Status: COMPLETED | OUTPATIENT
Start: 2024-01-05 | End: 2024-01-05

## 2024-01-05 RX ORDER — ONDANSETRON 8 MG/1
4 TABLET, FILM COATED ORAL EVERY 8 HOURS
Refills: 0 | Status: DISCONTINUED | OUTPATIENT
Start: 2024-01-05 | End: 2024-01-08

## 2024-01-05 RX ORDER — SODIUM CHLORIDE 9 MG/ML
500 INJECTION, SOLUTION INTRAVENOUS ONCE
Refills: 0 | Status: COMPLETED | OUTPATIENT
Start: 2024-01-05 | End: 2024-01-05

## 2024-01-05 RX ORDER — LEVETIRACETAM 250 MG/1
750 TABLET, FILM COATED ORAL EVERY 12 HOURS
Refills: 0 | Status: DISCONTINUED | OUTPATIENT
Start: 2024-01-06 | End: 2024-01-08

## 2024-01-05 RX ORDER — PANTOPRAZOLE SODIUM 20 MG/1
40 TABLET, DELAYED RELEASE ORAL
Refills: 0 | Status: DISCONTINUED | OUTPATIENT
Start: 2024-01-05 | End: 2024-01-05

## 2024-01-05 RX ORDER — LANOLIN ALCOHOL/MO/W.PET/CERES
3 CREAM (GRAM) TOPICAL AT BEDTIME
Refills: 0 | Status: DISCONTINUED | OUTPATIENT
Start: 2024-01-05 | End: 2024-01-05

## 2024-01-05 RX ORDER — LEVETIRACETAM 250 MG/1
750 TABLET, FILM COATED ORAL ONCE
Refills: 0 | Status: DISCONTINUED | OUTPATIENT
Start: 2024-01-05 | End: 2024-01-05

## 2024-01-05 RX ORDER — CYCLOSPORINE 0.5 MG/ML
1 EMULSION OPHTHALMIC
Refills: 0 | DISCHARGE

## 2024-01-05 RX ORDER — PANTOPRAZOLE SODIUM 20 MG/1
40 TABLET, DELAYED RELEASE ORAL ONCE
Refills: 0 | Status: COMPLETED | OUTPATIENT
Start: 2024-01-05 | End: 2024-01-05

## 2024-01-05 RX ORDER — LEVETIRACETAM 250 MG/1
7.5 TABLET, FILM COATED ORAL
Qty: 0 | Refills: 0 | DISCHARGE

## 2024-01-05 RX ORDER — ACETAMINOPHEN 500 MG
650 TABLET ORAL EVERY 6 HOURS
Refills: 0 | Status: DISCONTINUED | OUTPATIENT
Start: 2024-01-05 | End: 2024-01-05

## 2024-01-05 RX ORDER — LEVETIRACETAM 250 MG/1
750 TABLET, FILM COATED ORAL ONCE
Refills: 0 | Status: COMPLETED | OUTPATIENT
Start: 2024-01-05 | End: 2024-01-05

## 2024-01-05 RX ORDER — SODIUM CHLORIDE 9 MG/ML
1000 INJECTION, SOLUTION INTRAVENOUS ONCE
Refills: 0 | Status: COMPLETED | OUTPATIENT
Start: 2024-01-05 | End: 2024-01-05

## 2024-01-05 RX ADMIN — ONDANSETRON 4 MILLIGRAM(S): 8 TABLET, FILM COATED ORAL at 20:01

## 2024-01-05 RX ADMIN — PANTOPRAZOLE SODIUM 40 MILLIGRAM(S): 20 TABLET, DELAYED RELEASE ORAL at 23:08

## 2024-01-05 RX ADMIN — SODIUM CHLORIDE 500 MILLILITER(S): 9 INJECTION, SOLUTION INTRAVENOUS at 20:53

## 2024-01-05 RX ADMIN — LEVETIRACETAM 400 MILLIGRAM(S): 250 TABLET, FILM COATED ORAL at 20:52

## 2024-01-05 RX ADMIN — SODIUM CHLORIDE 1000 MILLILITER(S): 9 INJECTION, SOLUTION INTRAVENOUS at 17:28

## 2024-01-05 RX ADMIN — ONDANSETRON 4 MILLIGRAM(S): 8 TABLET, FILM COATED ORAL at 17:28

## 2024-01-05 NOTE — H&P ADULT - ASSESSMENT
33F w/Hx of developmental delay (nonverbal at baseline), seizures (generalized, myoclonic) presents due to fever, nausea, vomiting that began yesterday

## 2024-01-05 NOTE — ED PROVIDER NOTE - OBJECTIVE STATEMENT
Attending note.  Patient was seen in room #23 to the right.  Patient has developmental delay and is nonverbal.  History is from the patient's mother via video phone call and the home health aide.  Patient was well until yesterday when she developed a fever of 102.0.  She had nasal congestion and a cough that time.  Today patient vomited approximately noon and had a temperature of 101.0.  She was given 650 mg of Tylenol.  She has not had any diarrhea.  She had no urinary symptoms.  She was treated in September for urinary tract and infection which was presented with fever and vomiting.  She has past medical history of seizures as well, with the last one being in 2004.  Has been no abdominal pain.

## 2024-01-05 NOTE — ED ADULT NURSE NOTE - NSFALLUNIVINTERV_ED_ALL_ED
Bed/Stretcher in lowest position, wheels locked, appropriate side rails in place/Call bell, personal items and telephone in reach/Instruct patient to call for assistance before getting out of bed/chair/stretcher/Non-slip footwear applied when patient is off stretcher/[place] to call system/Physically safe environment - no spills, clutter or unnecessary equipment/Purposeful proactive rounding/Room/bathroom lighting operational, light cord in reach Bed/Stretcher in lowest position, wheels locked, appropriate side rails in place/Call bell, personal items and telephone in reach/Instruct patient to call for assistance before getting out of bed/chair/stretcher/Non-slip footwear applied when patient is off stretcher/[place] to call system/Physically safe environment - no spills, clutter or unnecessary equipment/Purposeful proactive rounding/Room/bathroom lighting operational, light cord in reach

## 2024-01-05 NOTE — ED PROVIDER NOTE - CROS ED CONS ALL NEG
- - - Mica Hagen MD  Division of Hospital Medicine  [facility]   Pager: [phone]    Patient seen and examined today with Team 3 Resident and Interns. Agree with above findings, assessment, and plan with the following additions/exceptions:    Overall, 62 yo Telugu speaking male with PMH of active EtOH abuse, chronic pancreatitis, recent fall found to have L nasal bone fracture and lumbar vertebral fracture of transverse process on last admission 5/12-5/14 who left AMA 1 day PTA who presents after being found down s/p fall after being found down by EMS with EMS/ER report of confusion and ataxia with horizontal nystagmus on exam concerning for Wernicke's.    1. Alcohol abuse/withdrawal: last drink day of last admission 5/12/21. BAL neg. c/w librium taper from last hospitalization yesterday. monitor on CIWA.   2. Fall: per patient, felt weak from his back pain. CT Head and C-spine unremarkable. check orthostatics.  3. Ataxia: report of confusion (resolved now) associated with ataxia and horizontal nystagmus concerning for Wernicke's in setting of EtOH abuse. check thiamine level. start high dose thiamine. f/u MRI brain.    SW consult    Rest as detailed in note above.    Plan discussed with patient via  and Team 3 Intern Dr. Garner. Mica Hagen MD  Division of Hospital Medicine  [facility]   Pager: [phone]    Patient seen and examined today with Team 3 Resident and Interns. Agree with above findings, assessment, and plan with the following additions/exceptions:    Overall, 60 yo Romanian speaking male with PMH of active EtOH abuse, chronic pancreatitis, recent fall found to have L nasal bone fracture and lumbar vertebral fracture of transverse process on last admission 5/12-5/14 who left AMA 1 day PTA who presents after being found down s/p fall after being found down by EMS with EMS/ER report of confusion and ataxia with horizontal nystagmus on exam concerning for Wernicke's.    1. Alcohol abuse/withdrawal: last drink day of last admission 5/12/21. BAL neg. c/w librium taper from last hospitalization yesterday. monitor on CIWA.   2. Fall: per patient, felt weak from his back pain. CT Head and C-spine unremarkable. f/u XR of chest, pelvis, knees, tib/fibs to evaluate for new fractures (has known previous nasal fracture and lumbar vertebral fracture from last admission). check orthostatics.  3. Ataxia: report of confusion (resolved now) associated with ataxia and horizontal nystagmus concerning for Wernicke's in setting of EtOH abuse. check thiamine level. start high dose thiamine. f/u MRI brain.    SW consult    Rest as detailed in note above.    Plan discussed with patient via  and Team 3 Intern Dr. Garner.

## 2024-01-05 NOTE — H&P ADULT - NSHPPHYSICALEXAM_GEN_ALL_CORE
T(C): 36.7 (01-05-24 @ 22:45), Max: 36.7 (01-05-24 @ 22:45)  HR: 98 (01-05-24 @ 22:45) (71 - 98)  BP: 117/77 (01-05-24 @ 22:45) (117/77 - 145/74)  RR: 16 (01-05-24 @ 22:45) (16 - 20)  SpO2: 100% (01-05-24 @ 22:45) (99% - 100%)    CONSTITUTIONAL: No apparent distress  EYES: PERRLA and symmetric, EOMI, No conjunctival or scleral injection, non-icteric  ENMT: Oral mucosa with moist membranes.  NECK: Supple, symmetric. No JVD.  RESP: No respiratory distress, no use of accessory muscles; CTA b/l, no WRR  CV: RRR, +S1S2, no MRG  GI: Soft, NT, ND, no rebound, no guarding  : No suprapubic tenderness. No CVA tenderness.  LYMPH: No cervical LAD or tenderness  MSK: No spinal tenderness, normal muscle strength/tone  EXTREMITIES: No pedal edema  SKIN: No rashes or ulcers noted  NEURO: A+Ox3, responsive. No tremor, sensation intact in upper and lower extremities b/l  PSYCH: Appropriate insight/judgment; mood and affect appropriate, recent/remote memory intact

## 2024-01-05 NOTE — ED PROVIDER NOTE - PROGRESS NOTE DETAILS
I received signout on this patient at the usual time, pending UA and reassessment with p.o. challenge.  UA was collected showing occasional bacteria but 9 epithelial cells, urine culture sent. Pt vomiting after attempting PO challenge, will redose zofran. -Tee Carbone PA-C

## 2024-01-05 NOTE — H&P ADULT - HISTORY OF PRESENT ILLNESS
Pt mother Lydia at on video phone penelope [facility] bedside, along w/family caretaker, who provide collateral as pt unable to participate in interview. Hx also obtained via chart review.    33F w/Hx of developmental delay (nonverbal at baseline), seizures (generalized, myoclonic) presents due to fever, nausea, vomiting that began yesterday. Patient was in normal state of health until she developed nasal congestion and fever yesterday. Today, began getting nauseous and vomiting and is no longer tolerating PO. Patient was admitted with similar symptoms in 9/2023 and was found to have UTI that was treated and patient able to successfully be safely discharged. Patient had one more episode of vomiting in ED despite zofran administration but otherwise no other acute complaints. Not tolerating PO at present. Pt mother Lydia at on video phone penelope [facility] bedside, along w/family caretaker, who provide collateral as pt unable to participate in interview. Hx also obtained via chart review.    33F w/Hx of developmental delay (nonverbal at baseline), seizures (generalized, myoclonic) presents due to fever, nausea, vomiting that began yesterday. Patient was in normal state of health until she developed nasal congestion and fever yesterday. Today, began getting nauseous and vomiting and is no longer tolerating PO. Patient was admitted with similar symptoms in 9/2023 and was found to have UTI that was treated and patient able to successfully be safely discharged. Patient had one more episode of vomiting in ED despite zofran administration but otherwise no other acute complaints. Not tolerating PO at present.

## 2024-01-05 NOTE — ED PROVIDER NOTE - DIFFERENTIAL DIAGNOSIS
Differential Diagnosis Patient with fever and vomiting with differential not limited to viral syndrome versus UTI versus other intra-abdominal process.

## 2024-01-05 NOTE — ED PROVIDER NOTE - CLINICAL SUMMARY MEDICAL DECISION MAKING FREE TEXT BOX
Attending note.  See differential above.  Labs, lipase, hCG, nasal swab for virus, Zofran, IV fluids, urinalysis and reassess.

## 2024-01-05 NOTE — ED PROVIDER NOTE - PHYSICAL EXAMINATION
Attn - alert, NAD, non-verbal, grinding teeth, no pallor or jaundice, PERRL 3 mm, moist mm, skin - warm and dry, Lungs - clear, no w/r/r, good BS bilaterally, Cor - rr, no M, no rub, Abdo - ND, soft, NT, no HSM, no CVAT, no guarding or rebound. Extremities - no edema, no calf tenderness, distal pulses intact and symmetrical, Neuro - intact and non-focal

## 2024-01-05 NOTE — ED ADULT NURSE NOTE - OBJECTIVE STATEMENT
32 y/o F MR, nonverbal at baseline, seizures on Keppra presented to ED via walk in triage brought in by mom c/o vomiting since today, per family pt started to have URI symptoms yesterday with cough and congestion, this AM pt had low grade fevers TMax 100, given tylenol at home last dose at 1200, a little past 1200 pt began vomiting, family said emesis was mainly phlegm with some food, nonbloody. Additionally, mom feels pt had decreased appetite this AM. Pt breathing spontaneously and unlabored on room air with even respirations, showing no signs of acute distress, safety measures in place with stretcher locked in lowest position, family at bedside. 34 y/o F MR, nonverbal at baseline, seizures on Keppra presented to ED via walk in triage brought in by mom c/o vomiting since today, per family pt started to have URI symptoms yesterday with cough and congestion, this AM pt had low grade fevers TMax 100, given tylenol at home last dose at 1200, a little past 1200 pt began vomiting, family said emesis was mainly phlegm with some food, nonbloody. Additionally, mom feels pt had decreased appetite this AM. Pt breathing spontaneously and unlabored on room air with even respirations, showing no signs of acute distress, safety measures in place with stretcher locked in lowest position, family at bedside.

## 2024-01-05 NOTE — H&P ADULT - PROBLEM SELECTOR PLAN 2
- Pt unable to tolerate PO and cannot be taken care of at home as a result  - Zofran PRN  - Pantoprazole 40mg IV given x1  - LR@75 for maintenance while not tolerating PO

## 2024-01-06 DIAGNOSIS — R11.2 NAUSEA WITH VOMITING, UNSPECIFIED: ICD-10-CM

## 2024-01-06 DIAGNOSIS — B33.8 OTHER SPECIFIED VIRAL DISEASES: ICD-10-CM

## 2024-01-06 DIAGNOSIS — R56.9 UNSPECIFIED CONVULSIONS: ICD-10-CM

## 2024-01-06 LAB
A1C WITH ESTIMATED AVERAGE GLUCOSE RESULT: 5.5 % — SIGNIFICANT CHANGE UP (ref 4–5.6)
A1C WITH ESTIMATED AVERAGE GLUCOSE RESULT: 5.5 % — SIGNIFICANT CHANGE UP (ref 4–5.6)
ALBUMIN SERPL ELPH-MCNC: 3.7 G/DL — SIGNIFICANT CHANGE UP (ref 3.3–5)
ALBUMIN SERPL ELPH-MCNC: 3.7 G/DL — SIGNIFICANT CHANGE UP (ref 3.3–5)
ALP SERPL-CCNC: 86 U/L — SIGNIFICANT CHANGE UP (ref 40–120)
ALP SERPL-CCNC: 86 U/L — SIGNIFICANT CHANGE UP (ref 40–120)
ALT FLD-CCNC: 26 U/L — SIGNIFICANT CHANGE UP (ref 10–45)
ALT FLD-CCNC: 26 U/L — SIGNIFICANT CHANGE UP (ref 10–45)
ANION GAP SERPL CALC-SCNC: 11 MMOL/L — SIGNIFICANT CHANGE UP (ref 5–17)
ANION GAP SERPL CALC-SCNC: 15 MMOL/L — SIGNIFICANT CHANGE UP (ref 5–17)
ANION GAP SERPL CALC-SCNC: 15 MMOL/L — SIGNIFICANT CHANGE UP (ref 5–17)
AST SERPL-CCNC: 54 U/L — HIGH (ref 10–40)
AST SERPL-CCNC: 54 U/L — HIGH (ref 10–40)
BILIRUB SERPL-MCNC: 0.6 MG/DL — SIGNIFICANT CHANGE UP (ref 0.2–1.2)
BILIRUB SERPL-MCNC: 0.6 MG/DL — SIGNIFICANT CHANGE UP (ref 0.2–1.2)
BUN SERPL-MCNC: 10 MG/DL — SIGNIFICANT CHANGE UP (ref 7–23)
BUN SERPL-MCNC: 11 MG/DL — SIGNIFICANT CHANGE UP (ref 7–23)
BUN SERPL-MCNC: 11 MG/DL — SIGNIFICANT CHANGE UP (ref 7–23)
CALCIUM SERPL-MCNC: 9.2 MG/DL — SIGNIFICANT CHANGE UP (ref 8.4–10.5)
CALCIUM SERPL-MCNC: 9.2 MG/DL — SIGNIFICANT CHANGE UP (ref 8.4–10.5)
CALCIUM SERPL-MCNC: 9.4 MG/DL — SIGNIFICANT CHANGE UP (ref 8.4–10.5)
CHLORIDE SERPL-SCNC: 100 MMOL/L — SIGNIFICANT CHANGE UP (ref 96–108)
CHLORIDE SERPL-SCNC: 102 MMOL/L — SIGNIFICANT CHANGE UP (ref 96–108)
CHLORIDE SERPL-SCNC: 102 MMOL/L — SIGNIFICANT CHANGE UP (ref 96–108)
CHOLEST SERPL-MCNC: 166 MG/DL — SIGNIFICANT CHANGE UP
CHOLEST SERPL-MCNC: 166 MG/DL — SIGNIFICANT CHANGE UP
CO2 SERPL-SCNC: 21 MMOL/L — LOW (ref 22–31)
CO2 SERPL-SCNC: 21 MMOL/L — LOW (ref 22–31)
CO2 SERPL-SCNC: 23 MMOL/L — SIGNIFICANT CHANGE UP (ref 22–31)
CO2 SERPL-SCNC: 23 MMOL/L — SIGNIFICANT CHANGE UP (ref 22–31)
CO2 SERPL-SCNC: 25 MMOL/L — SIGNIFICANT CHANGE UP (ref 22–31)
CO2 SERPL-SCNC: 25 MMOL/L — SIGNIFICANT CHANGE UP (ref 22–31)
CREAT SERPL-MCNC: 0.48 MG/DL — LOW (ref 0.5–1.3)
CREAT SERPL-MCNC: 0.48 MG/DL — LOW (ref 0.5–1.3)
CREAT SERPL-MCNC: 0.56 MG/DL — SIGNIFICANT CHANGE UP (ref 0.5–1.3)
EGFR: 123 ML/MIN/1.73M2 — SIGNIFICANT CHANGE UP
EGFR: 127 ML/MIN/1.73M2 — SIGNIFICANT CHANGE UP
EGFR: 127 ML/MIN/1.73M2 — SIGNIFICANT CHANGE UP
ESTIMATED AVERAGE GLUCOSE: 111 MG/DL — SIGNIFICANT CHANGE UP (ref 68–114)
ESTIMATED AVERAGE GLUCOSE: 111 MG/DL — SIGNIFICANT CHANGE UP (ref 68–114)
GLUCOSE SERPL-MCNC: 86 MG/DL — SIGNIFICANT CHANGE UP (ref 70–99)
GLUCOSE SERPL-MCNC: 86 MG/DL — SIGNIFICANT CHANGE UP (ref 70–99)
GLUCOSE SERPL-MCNC: 91 MG/DL — SIGNIFICANT CHANGE UP (ref 70–99)
GLUCOSE SERPL-MCNC: 91 MG/DL — SIGNIFICANT CHANGE UP (ref 70–99)
GLUCOSE SERPL-MCNC: 95 MG/DL — SIGNIFICANT CHANGE UP (ref 70–99)
GLUCOSE SERPL-MCNC: 95 MG/DL — SIGNIFICANT CHANGE UP (ref 70–99)
HCT VFR BLD CALC: 43.6 % — SIGNIFICANT CHANGE UP (ref 34.5–45)
HCT VFR BLD CALC: 43.6 % — SIGNIFICANT CHANGE UP (ref 34.5–45)
HDLC SERPL-MCNC: 52 MG/DL — SIGNIFICANT CHANGE UP
HDLC SERPL-MCNC: 52 MG/DL — SIGNIFICANT CHANGE UP
HGB BLD-MCNC: 13.5 G/DL — SIGNIFICANT CHANGE UP (ref 11.5–15.5)
HGB BLD-MCNC: 13.5 G/DL — SIGNIFICANT CHANGE UP (ref 11.5–15.5)
LIPID PNL WITH DIRECT LDL SERPL: 102 MG/DL — HIGH
LIPID PNL WITH DIRECT LDL SERPL: 102 MG/DL — HIGH
MCHC RBC-ENTMCNC: 29.5 PG — SIGNIFICANT CHANGE UP (ref 27–34)
MCHC RBC-ENTMCNC: 29.5 PG — SIGNIFICANT CHANGE UP (ref 27–34)
MCHC RBC-ENTMCNC: 31 GM/DL — LOW (ref 32–36)
MCHC RBC-ENTMCNC: 31 GM/DL — LOW (ref 32–36)
MCV RBC AUTO: 95.4 FL — SIGNIFICANT CHANGE UP (ref 80–100)
MCV RBC AUTO: 95.4 FL — SIGNIFICANT CHANGE UP (ref 80–100)
NON HDL CHOLESTEROL: 114 MG/DL — SIGNIFICANT CHANGE UP
NON HDL CHOLESTEROL: 114 MG/DL — SIGNIFICANT CHANGE UP
NRBC # BLD: 0 /100 WBCS — SIGNIFICANT CHANGE UP (ref 0–0)
NRBC # BLD: 0 /100 WBCS — SIGNIFICANT CHANGE UP (ref 0–0)
PLATELET # BLD AUTO: 301 K/UL — SIGNIFICANT CHANGE UP (ref 150–400)
PLATELET # BLD AUTO: 301 K/UL — SIGNIFICANT CHANGE UP (ref 150–400)
POTASSIUM SERPL-MCNC: 4.5 MMOL/L — SIGNIFICANT CHANGE UP (ref 3.5–5.3)
POTASSIUM SERPL-MCNC: 4.5 MMOL/L — SIGNIFICANT CHANGE UP (ref 3.5–5.3)
POTASSIUM SERPL-MCNC: 5.8 MMOL/L — HIGH (ref 3.5–5.3)
POTASSIUM SERPL-MCNC: 5.8 MMOL/L — HIGH (ref 3.5–5.3)
POTASSIUM SERPL-MCNC: 6.3 MMOL/L — CRITICAL HIGH (ref 3.5–5.3)
POTASSIUM SERPL-MCNC: 6.3 MMOL/L — CRITICAL HIGH (ref 3.5–5.3)
POTASSIUM SERPL-SCNC: 4.5 MMOL/L — SIGNIFICANT CHANGE UP (ref 3.5–5.3)
POTASSIUM SERPL-SCNC: 4.5 MMOL/L — SIGNIFICANT CHANGE UP (ref 3.5–5.3)
POTASSIUM SERPL-SCNC: 5.8 MMOL/L — HIGH (ref 3.5–5.3)
POTASSIUM SERPL-SCNC: 5.8 MMOL/L — HIGH (ref 3.5–5.3)
POTASSIUM SERPL-SCNC: 6.3 MMOL/L — CRITICAL HIGH (ref 3.5–5.3)
POTASSIUM SERPL-SCNC: 6.3 MMOL/L — CRITICAL HIGH (ref 3.5–5.3)
PROCALCITONIN SERPL-MCNC: 0.1 NG/ML — SIGNIFICANT CHANGE UP (ref 0.02–0.1)
PROCALCITONIN SERPL-MCNC: 0.1 NG/ML — SIGNIFICANT CHANGE UP (ref 0.02–0.1)
PROT SERPL-MCNC: 7.8 G/DL — SIGNIFICANT CHANGE UP (ref 6–8.3)
PROT SERPL-MCNC: 7.8 G/DL — SIGNIFICANT CHANGE UP (ref 6–8.3)
RBC # BLD: 4.57 M/UL — SIGNIFICANT CHANGE UP (ref 3.8–5.2)
RBC # BLD: 4.57 M/UL — SIGNIFICANT CHANGE UP (ref 3.8–5.2)
RBC # FLD: 12.2 % — SIGNIFICANT CHANGE UP (ref 10.3–14.5)
RBC # FLD: 12.2 % — SIGNIFICANT CHANGE UP (ref 10.3–14.5)
SODIUM SERPL-SCNC: 132 MMOL/L — LOW (ref 135–145)
SODIUM SERPL-SCNC: 132 MMOL/L — LOW (ref 135–145)
SODIUM SERPL-SCNC: 138 MMOL/L — SIGNIFICANT CHANGE UP (ref 135–145)
TRIGL SERPL-MCNC: 61 MG/DL — SIGNIFICANT CHANGE UP
TRIGL SERPL-MCNC: 61 MG/DL — SIGNIFICANT CHANGE UP
WBC # BLD: 12.65 K/UL — HIGH (ref 3.8–10.5)
WBC # BLD: 12.65 K/UL — HIGH (ref 3.8–10.5)
WBC # FLD AUTO: 12.65 K/UL — HIGH (ref 3.8–10.5)
WBC # FLD AUTO: 12.65 K/UL — HIGH (ref 3.8–10.5)

## 2024-01-06 PROCEDURE — 99233 SBSQ HOSP IP/OBS HIGH 50: CPT

## 2024-01-06 RX ORDER — SODIUM CHLORIDE 9 MG/ML
1000 INJECTION, SOLUTION INTRAVENOUS
Refills: 0 | Status: DISCONTINUED | OUTPATIENT
Start: 2024-01-06 | End: 2024-01-07

## 2024-01-06 RX ORDER — ACETAMINOPHEN 500 MG
1000 TABLET ORAL ONCE
Refills: 0 | Status: COMPLETED | OUTPATIENT
Start: 2024-01-06 | End: 2024-01-06

## 2024-01-06 RX ORDER — DEXTROSE 50 % IN WATER 50 %
50 SYRINGE (ML) INTRAVENOUS ONCE
Refills: 0 | Status: DISCONTINUED | OUTPATIENT
Start: 2024-01-06 | End: 2024-01-06

## 2024-01-06 RX ORDER — SODIUM ZIRCONIUM CYCLOSILICATE 10 G/10G
5 POWDER, FOR SUSPENSION ORAL ONCE
Refills: 0 | Status: DISCONTINUED | OUTPATIENT
Start: 2024-01-06 | End: 2024-01-06

## 2024-01-06 RX ORDER — INSULIN HUMAN 100 [IU]/ML
5 INJECTION, SOLUTION SUBCUTANEOUS ONCE
Refills: 0 | Status: DISCONTINUED | OUTPATIENT
Start: 2024-01-06 | End: 2024-01-06

## 2024-01-06 RX ADMIN — Medication 400 MILLIGRAM(S): at 17:36

## 2024-01-06 RX ADMIN — Medication 1000 MILLIGRAM(S): at 18:36

## 2024-01-06 RX ADMIN — ONDANSETRON 4 MILLIGRAM(S): 8 TABLET, FILM COATED ORAL at 00:57

## 2024-01-06 RX ADMIN — LEVETIRACETAM 400 MILLIGRAM(S): 250 TABLET, FILM COATED ORAL at 17:59

## 2024-01-06 RX ADMIN — LEVETIRACETAM 400 MILLIGRAM(S): 250 TABLET, FILM COATED ORAL at 05:47

## 2024-01-06 RX ADMIN — SODIUM CHLORIDE 75 MILLILITER(S): 9 INJECTION, SOLUTION INTRAVENOUS at 00:58

## 2024-01-06 NOTE — PROVIDER CONTACT NOTE (CRITICAL VALUE NOTIFICATION) - SITUATION
33F w/Hx of developmental delay (nonverbal at baseline), seizures (generalized, myoclonic) presents due to fever, nausea, vomiting that began yesterday. Potassium resulted 6.3

## 2024-01-06 NOTE — PATIENT PROFILE ADULT - FALL HARM RISK - FACTORS NURSING JUDGEMENT
Physical Therapy Daily Treatment    Visit Count: 3  Plan of Care: 9/9/2019 Through: 12/2/2019  Insurance Information: [employer] - ACTIVE COVERAGE PER ONLINE CHECK.  PER PLAN, NO COPAY, NO VISIT LIMIT. COVERAGE IS BASED ON MEDICAL NECESSITY. NO PRIOR AUTH REQUIRED  Referred by: Benedict Sandoval DC; Next provider visit (if known/scheduled): as needed  Medical Diagnosis (from order):       Diagnosis Information             Diagnosis      719.41 (ICD-9-CM) - M25.511 (ICD-10-CM) - Right shoulder pain                  Treatment Diagnosis: shoulder symptoms with increased pain/symptoms, impaired posture, impaired strength, impaired scapulohumeral rhythm, impaired muscle length/flexibility, impaired joint mobility/play, impaired activity tolerance  Precautions: none    SUBJECTIVE   Patient states that she did not like the tape because it felt like she couldn't move her shoulder and it got a little itchy as well. States that she only kept it on a day. States that it initially made the shoulder feel better but then after a while it didn't. State that the exercises continue to go okay and the shoulder is getting less tired when she does them.  Current Pain (0-10 scale): 0  Functional Change:  As above    OBJECTIVE     Improved glenohumeral control with internal and external rotation.    Treatment   Neuromuscular Reeducation:  Patient performed the following neuromuscular reeducation exercises in the clinic as noted below on today's date for  30 minutes total  Exercise  Right  9/16/19 9/23/19   Supine internal and external rotation at 90 degrees abduction with control X 2 x 10 X 1#  2 x 10   Scapular squeezes/ postural correction X    Shoulder abduction isometric with elbow bent X 10x 5 seconds X 15 x 5 seconds   Bilateral shoulder abduction isometric with resistance band loop at forearms X Yellow  10x  5 seconds X Yellow  15 x 5 seconds   Serratus anterior supine punches bilaterally  X 2 x 10    Bilateral shoulder shrugs against  wall  X 20x                       HEP = Home exercise program   X = HEP issued      Skilled input: education/instruction on new exercises and progression of current exercises    Home Program:   Access Code: [email or web address]   URL: [email or web address]   Date: 09/23/2019   Prepared by: Maria Alejandra Jenkins     Exercises  Supine Shoulder Internal Rotation - 10 reps - 1 sets - 1x daily - 7x weekly  Supine Shoulder External Rotation in Abduction - 10 reps - 1 sets - 1x daily - 7x weekly  Sitting or standing shoulder blade squeeze - 10 reps - 1 sets - 5 seconds hold - 1x daily - 7x weekly  Standing Isometric Shoulder Abduction with Doorway - Arm Bent - 15 reps - 1-2 sets - 5 seconds hold - 1x daily - 7x weekly  Isometric Shoulder Abduction with Resistance Loop - 15 reps - 1-2 sets - 5 seconds hold - 1x daily - 7x weekly  Supine Bilateral Shoulder Protraction - 10 reps - 2 sets - 1x daily - 7x weekly  [place]: shoulder upward rotation in flexion - 10 reps - 2 sets - 1x daily - 7x weekly    Writer verbally educated the patient and received verbal consent from the patient on hand placement, positioning of patient, and techniques to be performed today including tactile cues at shoulder and scapula for correct performance of exercises as described above and how they are pertinent to the patient's plan of care.      Suggestions for next session as indicated: progress per plan of care, progress exercises as tolerated    ASSESSMENT   Patient is demonstrating improved overall glenohumeral control with exercises and decreased fatigue with exercises.   Pain after treatment (patient reported, 0-10 scale): 0  Result of above outlined education: Verbalizes understanding and Demonstrates understanding    THERAPY DAILY BILLING   Insurance: [email or web address].     Evaluation Procedures:  No evaluation codes were used on this date of service    Timed Procedures:  Neuromuscular Re-Education, 30 minutes    Untimed Procedures:  No untimed codes  were used on this date of service    Total Treatment Time: 32 minutes    Maria Alejandra Jenkins, PT, DPT, MS, OCS   Yes

## 2024-01-06 NOTE — PATIENT PROFILE ADULT - FALL HARM RISK - HARM RISK INTERVENTIONS
Assistance OOB with selected safe patient handling equipment/Communicate Risk of Fall with Harm to all staff/Discuss with provider need for PT consult/Monitor gait and stability/Provide patient with walking aids - walker, cane, crutches/Reinforce activity limits and safety measures with patient and family/Tailored Fall Risk Interventions/Visual Cue: Yellow wristband and red socks/Bed in lowest position, wheels locked, appropriate side rails in place/Call bell, personal items and telephone in reach/Instruct patient to call for assistance before getting out of bed or chair/Non-slip footwear when patient is out of bed/[place] to call system/Physically safe environment - no spills, clutter or unnecessary equipment/Purposeful Proactive Rounding/Room/bathroom lighting operational, light cord in reach Assistance OOB with selected safe patient handling equipment/Communicate Risk of Fall with Harm to all staff/Discuss with provider need for PT consult/Monitor gait and stability/Provide patient with walking aids - walker, cane, crutches/Reinforce activity limits and safety measures with patient and family/Tailored Fall Risk Interventions/Visual Cue: Yellow wristband and red socks/Bed in lowest position, wheels locked, appropriate side rails in place/Call bell, personal items and telephone in reach/Instruct patient to call for assistance before getting out of bed or chair/Non-slip footwear when patient is out of bed/[place] to call system/Physically safe environment - no spills, clutter or unnecessary equipment/Purposeful Proactive Rounding/Room/bathroom lighting operational, light cord in reach

## 2024-01-06 NOTE — PATIENT PROFILE ADULT - FUNCTIONAL ASSESSMENT - DAILY ACTIVITY 1.
-History of chronic pain in the epigastrium after gallbladder removed.  History of IBS and GERD symptoms  - Colonoscopy scheduled on 10/20/21   - GI consultants    3 = A little assistance

## 2024-01-06 NOTE — PATIENT PROFILE ADULT - FUNCTIONAL ASSESSMENT - BASIC MOBILITY 5.
Health  Wellness Visit Note    Name: Caryl Cedeno  Clinic Number: [id number]  Physician: Berlin Saleem*  Diagnosis: No diagnosis found.  Past Medical History:   Diagnosis Date    Celiac disease     Fibromyalgia     Migraines     Small fiber neuropathy      Visit Number: 43  Precautions: POTS (postural orthostatic tachycardia syndrome) (Chronic)  Small fiber neuropathy - Primary  Fibromyalgia  Neck sx: disc replacement Nov 2016  6 Month: November 12 Month: May  Time In: 4:25PM  Time Out:5:20PM  Total TreatmentTime:  minutes    Subjective:   Patient reports  Feeling about the same, had a neck flare up yesterday, but did ok the rest of the weekend. She was able to go out for social activity. When she has a neck flare up it really limits her to just staying at home in the bed, most times. She was glad she made it to the appointment today, felt a little better after exercising. She has been doing better with trying to get in another cardio day with either the bike or the row machine at [facility].   HEP compliant.    Objective:   Caryl completed therapeutic stretches (EIL, MATI) and the following MedX exercise machines: core lumbar, torso rotation l/r, leg extension, leg curl, upright row, chest press, biceps curl, triceps extension, leg press    See exercise log in patient folder for rate of exertion and repetitions completed.     Pt did hip abduction upstairs in [facility] after.     Assessment:   Patient tolerated Cervical MedX machine and all peripheral machines with minimal pain, but did have dizziness on the torso rotation and the chest press. Warm up on bike and ice.     Plan:  Continue with established plan of care towards wellness goals. Pt will attend Physical Therapy and Wellness each once a week.     Health  : Osito Lizama  3/25/2019    
3 = A little assistance

## 2024-01-06 NOTE — PATIENT PROFILE ADULT - CAREGIVER ADDRESS
18-19 [address] [place] [id number] 18-19 [address] [place] [id number]

## 2024-01-07 LAB
ALBUMIN SERPL ELPH-MCNC: 3.9 G/DL — SIGNIFICANT CHANGE UP (ref 3.3–5)
ALBUMIN SERPL ELPH-MCNC: 3.9 G/DL — SIGNIFICANT CHANGE UP (ref 3.3–5)
ALP SERPL-CCNC: 89 U/L — SIGNIFICANT CHANGE UP (ref 40–120)
ALP SERPL-CCNC: 89 U/L — SIGNIFICANT CHANGE UP (ref 40–120)
ALT FLD-CCNC: 24 U/L — SIGNIFICANT CHANGE UP (ref 10–45)
ALT FLD-CCNC: 24 U/L — SIGNIFICANT CHANGE UP (ref 10–45)
ANION GAP SERPL CALC-SCNC: 15 MMOL/L — SIGNIFICANT CHANGE UP (ref 5–17)
ANION GAP SERPL CALC-SCNC: 15 MMOL/L — SIGNIFICANT CHANGE UP (ref 5–17)
AST SERPL-CCNC: 35 U/L — SIGNIFICANT CHANGE UP (ref 10–40)
AST SERPL-CCNC: 35 U/L — SIGNIFICANT CHANGE UP (ref 10–40)
BASOPHILS # BLD AUTO: 0.03 K/UL — SIGNIFICANT CHANGE UP (ref 0–0.2)
BASOPHILS # BLD AUTO: 0.03 K/UL — SIGNIFICANT CHANGE UP (ref 0–0.2)
BASOPHILS NFR BLD AUTO: 0.2 % — SIGNIFICANT CHANGE UP (ref 0–2)
BASOPHILS NFR BLD AUTO: 0.2 % — SIGNIFICANT CHANGE UP (ref 0–2)
BILIRUB SERPL-MCNC: 0.9 MG/DL — SIGNIFICANT CHANGE UP (ref 0.2–1.2)
BILIRUB SERPL-MCNC: 0.9 MG/DL — SIGNIFICANT CHANGE UP (ref 0.2–1.2)
BUN SERPL-MCNC: 11 MG/DL — SIGNIFICANT CHANGE UP (ref 7–23)
BUN SERPL-MCNC: 11 MG/DL — SIGNIFICANT CHANGE UP (ref 7–23)
CALCIUM SERPL-MCNC: 9 MG/DL — SIGNIFICANT CHANGE UP (ref 8.4–10.5)
CALCIUM SERPL-MCNC: 9 MG/DL — SIGNIFICANT CHANGE UP (ref 8.4–10.5)
CHLORIDE SERPL-SCNC: 99 MMOL/L — SIGNIFICANT CHANGE UP (ref 96–108)
CHLORIDE SERPL-SCNC: 99 MMOL/L — SIGNIFICANT CHANGE UP (ref 96–108)
CO2 SERPL-SCNC: 20 MMOL/L — LOW (ref 22–31)
CO2 SERPL-SCNC: 20 MMOL/L — LOW (ref 22–31)
CREAT SERPL-MCNC: 0.43 MG/DL — LOW (ref 0.5–1.3)
CREAT SERPL-MCNC: 0.43 MG/DL — LOW (ref 0.5–1.3)
CULTURE RESULTS: SIGNIFICANT CHANGE UP
CULTURE RESULTS: SIGNIFICANT CHANGE UP
EGFR: 131 ML/MIN/1.73M2 — SIGNIFICANT CHANGE UP
EGFR: 131 ML/MIN/1.73M2 — SIGNIFICANT CHANGE UP
EOSINOPHIL # BLD AUTO: 0.16 K/UL — SIGNIFICANT CHANGE UP (ref 0–0.5)
EOSINOPHIL # BLD AUTO: 0.16 K/UL — SIGNIFICANT CHANGE UP (ref 0–0.5)
EOSINOPHIL NFR BLD AUTO: 1.3 % — SIGNIFICANT CHANGE UP (ref 0–6)
EOSINOPHIL NFR BLD AUTO: 1.3 % — SIGNIFICANT CHANGE UP (ref 0–6)
GLUCOSE BLDC GLUCOMTR-MCNC: 100 MG/DL — HIGH (ref 70–99)
GLUCOSE BLDC GLUCOMTR-MCNC: 100 MG/DL — HIGH (ref 70–99)
GLUCOSE BLDC GLUCOMTR-MCNC: 112 MG/DL — HIGH (ref 70–99)
GLUCOSE BLDC GLUCOMTR-MCNC: 112 MG/DL — HIGH (ref 70–99)
GLUCOSE BLDC GLUCOMTR-MCNC: 68 MG/DL — LOW (ref 70–99)
GLUCOSE BLDC GLUCOMTR-MCNC: 68 MG/DL — LOW (ref 70–99)
GLUCOSE BLDC GLUCOMTR-MCNC: 97 MG/DL — SIGNIFICANT CHANGE UP (ref 70–99)
GLUCOSE BLDC GLUCOMTR-MCNC: 97 MG/DL — SIGNIFICANT CHANGE UP (ref 70–99)
GLUCOSE SERPL-MCNC: 57 MG/DL — LOW (ref 70–99)
GLUCOSE SERPL-MCNC: 57 MG/DL — LOW (ref 70–99)
HCT VFR BLD CALC: 40.8 % — SIGNIFICANT CHANGE UP (ref 34.5–45)
HCT VFR BLD CALC: 40.8 % — SIGNIFICANT CHANGE UP (ref 34.5–45)
HGB BLD-MCNC: 13.3 G/DL — SIGNIFICANT CHANGE UP (ref 11.5–15.5)
HGB BLD-MCNC: 13.3 G/DL — SIGNIFICANT CHANGE UP (ref 11.5–15.5)
IMM GRANULOCYTES NFR BLD AUTO: 0.3 % — SIGNIFICANT CHANGE UP (ref 0–0.9)
IMM GRANULOCYTES NFR BLD AUTO: 0.3 % — SIGNIFICANT CHANGE UP (ref 0–0.9)
LACTATE SERPL-SCNC: 1.5 MMOL/L — SIGNIFICANT CHANGE UP (ref 0.5–2)
LACTATE SERPL-SCNC: 1.5 MMOL/L — SIGNIFICANT CHANGE UP (ref 0.5–2)
LYMPHOCYTES # BLD AUTO: 1.97 K/UL — SIGNIFICANT CHANGE UP (ref 1–3.3)
LYMPHOCYTES # BLD AUTO: 1.97 K/UL — SIGNIFICANT CHANGE UP (ref 1–3.3)
LYMPHOCYTES # BLD AUTO: 15.4 % — SIGNIFICANT CHANGE UP (ref 13–44)
LYMPHOCYTES # BLD AUTO: 15.4 % — SIGNIFICANT CHANGE UP (ref 13–44)
MAGNESIUM SERPL-MCNC: 2.2 MG/DL — SIGNIFICANT CHANGE UP (ref 1.6–2.6)
MAGNESIUM SERPL-MCNC: 2.2 MG/DL — SIGNIFICANT CHANGE UP (ref 1.6–2.6)
MCHC RBC-ENTMCNC: 30.6 PG — SIGNIFICANT CHANGE UP (ref 27–34)
MCHC RBC-ENTMCNC: 30.6 PG — SIGNIFICANT CHANGE UP (ref 27–34)
MCHC RBC-ENTMCNC: 32.6 GM/DL — SIGNIFICANT CHANGE UP (ref 32–36)
MCHC RBC-ENTMCNC: 32.6 GM/DL — SIGNIFICANT CHANGE UP (ref 32–36)
MCV RBC AUTO: 93.8 FL — SIGNIFICANT CHANGE UP (ref 80–100)
MCV RBC AUTO: 93.8 FL — SIGNIFICANT CHANGE UP (ref 80–100)
MONOCYTES # BLD AUTO: 1.59 K/UL — HIGH (ref 0–0.9)
MONOCYTES # BLD AUTO: 1.59 K/UL — HIGH (ref 0–0.9)
MONOCYTES NFR BLD AUTO: 12.4 % — SIGNIFICANT CHANGE UP (ref 2–14)
MONOCYTES NFR BLD AUTO: 12.4 % — SIGNIFICANT CHANGE UP (ref 2–14)
NEUTROPHILS # BLD AUTO: 9.01 K/UL — HIGH (ref 1.8–7.4)
NEUTROPHILS # BLD AUTO: 9.01 K/UL — HIGH (ref 1.8–7.4)
NEUTROPHILS NFR BLD AUTO: 70.4 % — SIGNIFICANT CHANGE UP (ref 43–77)
NEUTROPHILS NFR BLD AUTO: 70.4 % — SIGNIFICANT CHANGE UP (ref 43–77)
NRBC # BLD: 0 /100 WBCS — SIGNIFICANT CHANGE UP (ref 0–0)
NRBC # BLD: 0 /100 WBCS — SIGNIFICANT CHANGE UP (ref 0–0)
PHOSPHATE SERPL-MCNC: 2.4 MG/DL — LOW (ref 2.5–4.5)
PHOSPHATE SERPL-MCNC: 2.4 MG/DL — LOW (ref 2.5–4.5)
PLATELET # BLD AUTO: 301 K/UL — SIGNIFICANT CHANGE UP (ref 150–400)
PLATELET # BLD AUTO: 301 K/UL — SIGNIFICANT CHANGE UP (ref 150–400)
POTASSIUM SERPL-MCNC: 4.4 MMOL/L — SIGNIFICANT CHANGE UP (ref 3.5–5.3)
POTASSIUM SERPL-MCNC: 4.4 MMOL/L — SIGNIFICANT CHANGE UP (ref 3.5–5.3)
POTASSIUM SERPL-SCNC: 4.4 MMOL/L — SIGNIFICANT CHANGE UP (ref 3.5–5.3)
POTASSIUM SERPL-SCNC: 4.4 MMOL/L — SIGNIFICANT CHANGE UP (ref 3.5–5.3)
PROT SERPL-MCNC: 7.8 G/DL — SIGNIFICANT CHANGE UP (ref 6–8.3)
PROT SERPL-MCNC: 7.8 G/DL — SIGNIFICANT CHANGE UP (ref 6–8.3)
RBC # BLD: 4.35 M/UL — SIGNIFICANT CHANGE UP (ref 3.8–5.2)
RBC # BLD: 4.35 M/UL — SIGNIFICANT CHANGE UP (ref 3.8–5.2)
RBC # FLD: 12 % — SIGNIFICANT CHANGE UP (ref 10.3–14.5)
RBC # FLD: 12 % — SIGNIFICANT CHANGE UP (ref 10.3–14.5)
SODIUM SERPL-SCNC: 134 MMOL/L — LOW (ref 135–145)
SODIUM SERPL-SCNC: 134 MMOL/L — LOW (ref 135–145)
SPECIMEN SOURCE: SIGNIFICANT CHANGE UP
SPECIMEN SOURCE: SIGNIFICANT CHANGE UP
WBC # BLD: 12.8 K/UL — HIGH (ref 3.8–10.5)
WBC # BLD: 12.8 K/UL — HIGH (ref 3.8–10.5)
WBC # FLD AUTO: 12.8 K/UL — HIGH (ref 3.8–10.5)
WBC # FLD AUTO: 12.8 K/UL — HIGH (ref 3.8–10.5)

## 2024-01-07 PROCEDURE — 99233 SBSQ HOSP IP/OBS HIGH 50: CPT

## 2024-01-07 RX ORDER — SODIUM CHLORIDE 9 MG/ML
1000 INJECTION, SOLUTION INTRAVENOUS
Refills: 0 | Status: DISCONTINUED | OUTPATIENT
Start: 2024-01-07 | End: 2024-01-08

## 2024-01-07 RX ADMIN — LEVETIRACETAM 400 MILLIGRAM(S): 250 TABLET, FILM COATED ORAL at 05:34

## 2024-01-07 RX ADMIN — SODIUM CHLORIDE 75 MILLILITER(S): 9 INJECTION, SOLUTION INTRAVENOUS at 08:46

## 2024-01-07 RX ADMIN — LEVETIRACETAM 400 MILLIGRAM(S): 250 TABLET, FILM COATED ORAL at 17:13

## 2024-01-07 RX ADMIN — SODIUM CHLORIDE 75 MILLILITER(S): 9 INJECTION, SOLUTION INTRAVENOUS at 22:16

## 2024-01-07 RX ADMIN — SODIUM CHLORIDE 75 MILLILITER(S): 9 INJECTION, SOLUTION INTRAVENOUS at 05:34

## 2024-01-07 NOTE — PROGRESS NOTE ADULT - ASSESSMENT
33F w/Hx of developmental delay (nonverbal at baseline), seizures (generalized, myoclonic) presents due to fever, nausea, vomiting that began yesterday likely in the setting of RSV infection. 
33F w/Hx of developmental delay (nonverbal at baseline), seizures (generalized, myoclonic) presents due to fever, nausea, vomiting that began yesterday likely in the setting of RSV infection.

## 2024-01-07 NOTE — PROGRESS NOTE ADULT - NSPROGADDITIONALINFOA_GEN_ALL_CORE
Plan d/w Dr. Levin covering ACP. Patient's aunt and caregiver both updated at bedside, all questions answered. Hyperkalemia noted on labs, specimens hemolyzed both times, pending repeat BMP.
Plan d/w ACP Romain Ceballos, sister updated at bedside

## 2024-01-07 NOTE — PROGRESS NOTE ADULT - TIME BILLING
time spent reviewing prior charts, meds, discussing plan with patient, family = 52 minutes
time spent reviewing prior charts, meds, discussing plan with patient/family= 51 minutes

## 2024-01-07 NOTE — PROGRESS NOTE ADULT - SUBJECTIVE AND OBJECTIVE BOX
Alize Alvares MD  Division of Hospital Medicine  Reachable on MS Teams    PROGRESS NOTE:     Patient is a 33y old  Female who presents with a chief complaint of Intractable Nausea (05 Jan 2024 23:50)      SUBJECTIVE / OVERNIGHT EVENTS: Seen this AM, sleeping. Family and caregiver at bedside. They report patient has not had any more vomiting. Unable to elicit ROS    ADDITIONAL REVIEW OF SYSTEMS:    MEDICATIONS  (STANDING):  dextrose 50% Injectable 50 milliLiter(s) IV Push once  insulin regular  human recombinant 5 Unit(s) IV Push once  lactated ringers. 1000 milliLiter(s) (75 mL/Hr) IV Continuous <Continuous>  levETIRAcetam  IVPB 750 milliGRAM(s) IV Intermittent every 12 hours  sodium zirconium cyclosilicate 5 Gram(s) Oral once    MEDICATIONS  (PRN):  ondansetron Injectable 4 milliGRAM(s) IV Push every 8 hours PRN Nausea and/or Vomiting      CAPILLARY BLOOD GLUCOSE        I&O's Summary      PHYSICAL EXAM:  Vital Signs Last 24 Hrs  T(C): 37.6 (06 Jan 2024 14:09), Max: 37.6 (06 Jan 2024 14:09)  T(F): 99.7 (06 Jan 2024 14:09), Max: 99.7 (06 Jan 2024 14:09)  HR: 108 (06 Jan 2024 14:09) (78 - 108)  BP: 128/83 (06 Jan 2024 14:09) (117/77 - 145/74)  BP(mean): --  RR: 18 (06 Jan 2024 14:09) (16 - 18)  SpO2: 95% (06 Jan 2024 14:09) (95% - 100%)    Parameters below as of 06 Jan 2024 14:09  Patient On (Oxygen Delivery Method): room air        CONSTITUTIONAL: NAD, well-developed  RESPIRATORY: Normal respiratory effort; upper respiratory sounds, no wheeze bilaterally  CARDIOVASCULAR: Regular rate and rhythm, normal S1 and S2, no murmur/rub/gallop; No lower extremity edema; Peripheral pulses are 2+ bilaterally  ABDOMEN: Nontender to palpation, normoactive bowel sounds, no rebound/guarding; No hepatosplenomegaly  MUSCLOSKELETAL: no clubbing or cyanosis of digits; no joint swelling or tenderness to palpation  PSYCH: unable to assess, baseline nonverbal     LABS:                        13.5   12.65 )-----------( 301      ( 06 Jan 2024 07:57 )             43.6     01-06    132<L>  |  100  |  10  ----------------------------<  95  6.3<HH>   |  21<L>  |  0.56    Ca    9.2      06 Jan 2024 11:54  Phos  3.6     01-05  Mg     2.4     01-05    TPro  7.8  /  Alb  3.7  /  TBili  0.6  /  DBili  x   /  AST  54<H>  /  ALT  26  /  AlkPhos  86  01-06          Urinalysis Basic - ( 06 Jan 2024 11:54 )    Color: x / Appearance: x / SG: x / pH: x  Gluc: 95 mg/dL / Ketone: x  / Bili: x / Urobili: x   Blood: x / Protein: x / Nitrite: x   Leuk Esterase: x / RBC: x / WBC x   Sq Epi: x / Non Sq Epi: x / Bacteria: x          RADIOLOGY & ADDITIONAL TESTS:  Results Reviewed:   Imaging Personally Reviewed:  Electrocardiogram Personally Reviewed:    COORDINATION OF CARE:  Care Discussed with Consultants/Other Providers [Y/N]:  Prior or Outpatient Records Reviewed [Y/N]:  
Alize Alvares MD  Division of Hospital Medicine  Reachable on MS Teams    PROGRESS NOTE:     Patient is a 33y old  Female who presents with a chief complaint of Intractable Nausea (06 Jan 2024 15:43)      SUBJECTIVE / OVERNIGHT EVENTS: Temp of 102 overnight, hypoglycemic this AM. Seen this AM, awake and watching tv on her computer, sister at bedside. Overall appears improved, nonverbal so ROS not obtained. Still has not regained appetite but has not had any further episodes of vomiting     ADDITIONAL REVIEW OF SYSTEMS:    MEDICATIONS  (STANDING):  dextrose 5% + lactated ringers. 1000 milliLiter(s) (75 mL/Hr) IV Continuous <Continuous>  levETIRAcetam  IVPB 750 milliGRAM(s) IV Intermittent every 12 hours    MEDICATIONS  (PRN):  ondansetron Injectable 4 milliGRAM(s) IV Push every 8 hours PRN Nausea and/or Vomiting      CAPILLARY BLOOD GLUCOSE      POCT Blood Glucose.: 112 mg/dL (07 Jan 2024 12:01)  POCT Blood Glucose.: 100 mg/dL (07 Jan 2024 09:19)  POCT Blood Glucose.: 68 mg/dL (07 Jan 2024 08:48)    I&O's Summary    06 Jan 2024 07:01  -  07 Jan 2024 07:00  --------------------------------------------------------  IN: 1140 mL / OUT: 400 mL / NET: 740 mL        PHYSICAL EXAM:  Vital Signs Last 24 Hrs  T(C): 36.4 (07 Jan 2024 12:30), Max: 36.7 (06 Jan 2024 21:23)  T(F): 97.6 (07 Jan 2024 12:30), Max: 98.1 (06 Jan 2024 21:23)  HR: 104 (07 Jan 2024 12:30) (104 - 110)  BP: 116/65 (07 Jan 2024 12:30) (107/69 - 116/65)  BP(mean): --  RR: 18 (07 Jan 2024 12:30) (18 - 18)  SpO2: 95% (07 Jan 2024 12:30) (94% - 95%)    Parameters below as of 07 Jan 2024 12:30  Patient On (Oxygen Delivery Method): room air        CONSTITUTIONAL: NAD, well-developed  RESPIRATORY: Normal respiratory effort; upper respiratory sounds, no wheeze bilaterally, cough  CARDIOVASCULAR: Regular rate and rhythm, normal S1 and S2, no murmur/rub/gallop; No lower extremity edema; Peripheral pulses are 2+ bilaterally  ABDOMEN: Nontender to palpation, normoactive bowel sounds, no rebound/guarding; No hepatosplenomegaly  MUSCLOSKELETAL: no clubbing or cyanosis of digits; no joint swelling or tenderness to palpation  PSYCH: unable to assess, baseline nonverbal       LABS:                        13.3   12.80 )-----------( 301      ( 07 Jan 2024 06:03 )             40.8     01-07    134<L>  |  99  |  11  ----------------------------<  57<L>  4.4   |  20<L>  |  0.43<L>    Ca    9.0      07 Jan 2024 06:03  Phos  2.4     01-07  Mg     2.2     01-07    TPro  7.8  /  Alb  3.9  /  TBili  0.9  /  DBili  x   /  AST  35  /  ALT  24  /  AlkPhos  89  01-07          Urinalysis Basic - ( 07 Jan 2024 06:03 )    Color: x / Appearance: x / SG: x / pH: x  Gluc: 57 mg/dL / Ketone: x  / Bili: x / Urobili: x   Blood: x / Protein: x / Nitrite: x   Leuk Esterase: x / RBC: x / WBC x   Sq Epi: x / Non Sq Epi: x / Bacteria: x        Culture - Urine (collected 05 Jan 2024 19:34)  Source: Clean Catch Clean Catch (Midstream)  Final Report (07 Jan 2024 10:56):    >=3 organisms. Probable collection contamination.        RADIOLOGY & ADDITIONAL TESTS:  Results Reviewed:   Imaging Personally Reviewed:  Electrocardiogram Personally Reviewed:    COORDINATION OF CARE:  Care Discussed with Consultants/Other Providers [Y/N]:  Prior or Outpatient Records Reviewed [Y/N]:

## 2024-01-07 NOTE — PROGRESS NOTE ADULT - PROBLEM SELECTOR PLAN 1
- RSV positive, procal 0.01, no concern for superimposed bacterial infection at this time  - CXR wnl  - PRN IV acetaminophen for fever  - anti-tussives as needed  - supportive care
- RSV positive, procal 0.01, no concern for superimposed bacterial infection at this time  - CXR wnl  - PRN IV acetaminophen for fever  - anti-tussives as needed  - supportive care

## 2024-01-08 ENCOUNTER — TRANSCRIPTION ENCOUNTER (OUTPATIENT)
Age: 34
End: 2024-01-08

## 2024-01-08 VITALS
OXYGEN SATURATION: 96 % | HEART RATE: 93 BPM | RESPIRATION RATE: 18 BRPM | TEMPERATURE: 98 F | SYSTOLIC BLOOD PRESSURE: 123 MMHG | DIASTOLIC BLOOD PRESSURE: 91 MMHG

## 2024-01-08 LAB
ANION GAP SERPL CALC-SCNC: 12 MMOL/L — SIGNIFICANT CHANGE UP (ref 5–17)
ANION GAP SERPL CALC-SCNC: 12 MMOL/L — SIGNIFICANT CHANGE UP (ref 5–17)
BUN SERPL-MCNC: 6 MG/DL — LOW (ref 7–23)
BUN SERPL-MCNC: 6 MG/DL — LOW (ref 7–23)
CALCIUM SERPL-MCNC: 8.7 MG/DL — SIGNIFICANT CHANGE UP (ref 8.4–10.5)
CALCIUM SERPL-MCNC: 8.7 MG/DL — SIGNIFICANT CHANGE UP (ref 8.4–10.5)
CHLORIDE SERPL-SCNC: 104 MMOL/L — SIGNIFICANT CHANGE UP (ref 96–108)
CHLORIDE SERPL-SCNC: 104 MMOL/L — SIGNIFICANT CHANGE UP (ref 96–108)
CO2 SERPL-SCNC: 22 MMOL/L — SIGNIFICANT CHANGE UP (ref 22–31)
CO2 SERPL-SCNC: 22 MMOL/L — SIGNIFICANT CHANGE UP (ref 22–31)
CREAT SERPL-MCNC: 0.45 MG/DL — LOW (ref 0.5–1.3)
CREAT SERPL-MCNC: 0.45 MG/DL — LOW (ref 0.5–1.3)
EGFR: 129 ML/MIN/1.73M2 — SIGNIFICANT CHANGE UP
EGFR: 129 ML/MIN/1.73M2 — SIGNIFICANT CHANGE UP
GLUCOSE BLDC GLUCOMTR-MCNC: 112 MG/DL — HIGH (ref 70–99)
GLUCOSE BLDC GLUCOMTR-MCNC: 112 MG/DL — HIGH (ref 70–99)
GLUCOSE BLDC GLUCOMTR-MCNC: 94 MG/DL — SIGNIFICANT CHANGE UP (ref 70–99)
GLUCOSE BLDC GLUCOMTR-MCNC: 94 MG/DL — SIGNIFICANT CHANGE UP (ref 70–99)
GLUCOSE BLDC GLUCOMTR-MCNC: 98 MG/DL — SIGNIFICANT CHANGE UP (ref 70–99)
GLUCOSE BLDC GLUCOMTR-MCNC: 98 MG/DL — SIGNIFICANT CHANGE UP (ref 70–99)
GLUCOSE SERPL-MCNC: 116 MG/DL — HIGH (ref 70–99)
GLUCOSE SERPL-MCNC: 116 MG/DL — HIGH (ref 70–99)
MAGNESIUM SERPL-MCNC: 2.2 MG/DL — SIGNIFICANT CHANGE UP (ref 1.6–2.6)
MAGNESIUM SERPL-MCNC: 2.2 MG/DL — SIGNIFICANT CHANGE UP (ref 1.6–2.6)
PHOSPHATE SERPL-MCNC: 2.7 MG/DL — SIGNIFICANT CHANGE UP (ref 2.5–4.5)
PHOSPHATE SERPL-MCNC: 2.7 MG/DL — SIGNIFICANT CHANGE UP (ref 2.5–4.5)
POTASSIUM SERPL-MCNC: 3.9 MMOL/L — SIGNIFICANT CHANGE UP (ref 3.5–5.3)
POTASSIUM SERPL-MCNC: 3.9 MMOL/L — SIGNIFICANT CHANGE UP (ref 3.5–5.3)
POTASSIUM SERPL-SCNC: 3.9 MMOL/L — SIGNIFICANT CHANGE UP (ref 3.5–5.3)
POTASSIUM SERPL-SCNC: 3.9 MMOL/L — SIGNIFICANT CHANGE UP (ref 3.5–5.3)
SODIUM SERPL-SCNC: 138 MMOL/L — SIGNIFICANT CHANGE UP (ref 135–145)
SODIUM SERPL-SCNC: 138 MMOL/L — SIGNIFICANT CHANGE UP (ref 135–145)

## 2024-01-08 PROCEDURE — 83605 ASSAY OF LACTIC ACID: CPT

## 2024-01-08 PROCEDURE — 99239 HOSP IP/OBS DSCHRG MGMT >30: CPT

## 2024-01-08 PROCEDURE — 87086 URINE CULTURE/COLONY COUNT: CPT

## 2024-01-08 PROCEDURE — 80061 LIPID PANEL: CPT

## 2024-01-08 PROCEDURE — 82947 ASSAY GLUCOSE BLOOD QUANT: CPT

## 2024-01-08 PROCEDURE — 80048 BASIC METABOLIC PNL TOTAL CA: CPT

## 2024-01-08 PROCEDURE — 83735 ASSAY OF MAGNESIUM: CPT

## 2024-01-08 PROCEDURE — 83690 ASSAY OF LIPASE: CPT

## 2024-01-08 PROCEDURE — 84100 ASSAY OF PHOSPHORUS: CPT

## 2024-01-08 PROCEDURE — 80177 DRUG SCRN QUAN LEVETIRACETAM: CPT

## 2024-01-08 PROCEDURE — 85018 HEMOGLOBIN: CPT

## 2024-01-08 PROCEDURE — 82435 ASSAY OF BLOOD CHLORIDE: CPT

## 2024-01-08 PROCEDURE — 84145 PROCALCITONIN (PCT): CPT

## 2024-01-08 PROCEDURE — 80053 COMPREHEN METABOLIC PANEL: CPT

## 2024-01-08 PROCEDURE — 85027 COMPLETE CBC AUTOMATED: CPT

## 2024-01-08 PROCEDURE — 84132 ASSAY OF SERUM POTASSIUM: CPT

## 2024-01-08 PROCEDURE — 71045 X-RAY EXAM CHEST 1 VIEW: CPT

## 2024-01-08 PROCEDURE — 36415 COLL VENOUS BLD VENIPUNCTURE: CPT

## 2024-01-08 PROCEDURE — 82803 BLOOD GASES ANY COMBINATION: CPT

## 2024-01-08 PROCEDURE — 99285 EMERGENCY DEPT VISIT HI MDM: CPT

## 2024-01-08 PROCEDURE — 84702 CHORIONIC GONADOTROPIN TEST: CPT

## 2024-01-08 PROCEDURE — 87637 SARSCOV2&INF A&B&RSV AMP PRB: CPT

## 2024-01-08 PROCEDURE — 82330 ASSAY OF CALCIUM: CPT

## 2024-01-08 PROCEDURE — 82962 GLUCOSE BLOOD TEST: CPT

## 2024-01-08 PROCEDURE — 87040 BLOOD CULTURE FOR BACTERIA: CPT

## 2024-01-08 PROCEDURE — 83036 HEMOGLOBIN GLYCOSYLATED A1C: CPT

## 2024-01-08 PROCEDURE — 85014 HEMATOCRIT: CPT

## 2024-01-08 PROCEDURE — 84295 ASSAY OF SERUM SODIUM: CPT

## 2024-01-08 PROCEDURE — 81001 URINALYSIS AUTO W/SCOPE: CPT

## 2024-01-08 PROCEDURE — 85025 COMPLETE CBC W/AUTO DIFF WBC: CPT

## 2024-01-08 RX ADMIN — LEVETIRACETAM 400 MILLIGRAM(S): 250 TABLET, FILM COATED ORAL at 07:08

## 2024-01-08 NOTE — DISCHARGE NOTE NURSING/CASE MANAGEMENT/SOCIAL WORK - PATIENT PORTAL LINK FT
You can access the FollowMyHealth Patient Portal offered by [facility] by registering at the following website: [email or web address]. By joining [employer]’s FollowMyHealth portal, you will also be able to view your health information using other applications (apps) compatible with our system. You can access the FollowMyHealth Patient Portal offered by [facility] by registering at the following website: [email or web address]. By joining [employer]’s FollowMyHealth portal, you will also be able to view your health information using other applications (apps) compatible with our system.

## 2024-01-08 NOTE — DISCHARGE NOTE PROVIDER - NSDCCPCAREPLAN_GEN_ALL_CORE_FT
PRINCIPAL DISCHARGE DIAGNOSIS  Diagnosis: RSV infection  Assessment and Plan of Treatment: Urine and bllod culture negative. Chest X ray normal. Continue supportive care. Maintain isolation for total of 5 days.      SECONDARY DISCHARGE DIAGNOSES  Diagnosis: Nausea & vomiting  Assessment and Plan of Treatment: Likely due to RSV. Now resolved.     PRINCIPAL DISCHARGE DIAGNOSIS  Diagnosis: RSV infection  Assessment and Plan of Treatment: Respiratory Syncytial Virus Infection, Adult  Respiratory syncytial virus (RSV) infection is an infection caused by RSV, a common virus. This virus is similar to viruses that cause the common cold and the flu. RSV infection can affect the nose, throat, windpipe, and lungs (respiratory system). When the infection is severe, it can cause:  Bronchiolitis. This condition causes inflammation of the air passages in the lungs (bronchioles).  Pneumonia. This condition causes inflammation of the air sacs in the lungs.  RSV infection spreads from person to person (is contagious) through droplets from coughs and sneezes (respiratory secretions). This condition is rarely serious when it occurs in adults.  What are the causes?  This condition is caused by contact with RSV. This can happen by:  Breathing respiratory secretions from someone who has the infection.  Touching something that has been exposed to the virus (is contaminated) and then touching your mouth, nose, or eyes.  Coming in close contact with someone who has this infection. This may happen if you:  Hug or kiss.  Shake or hold hands.  Eat or drink using the same dishes or utensils.  What increases the risk?  The following factors may make you more likely to develop this condition:  Being 65 years of age or older.  Having certain health conditions, including:  A long-term (chronic) lung condition, such as chronic obstructive pulmonary disease (COPD).  An immune system that is weak. This is your body's defense system.  Down syndrome.  Heart disease.  Working in a hospital or other health care facility.  Living in a long-term health care facility.  RSV infections are most common from the months of November to April, but they can happen any time of year.  What are the signs or symptoms?  Symptoms of this condition include:  Having a runny nose.  Coughing. You may have a cough that brings up mucus (productive cough).  Sneezing.  Having a fever.  Wanting to eat less than usual.  Breathing loudly (wheezing).  Having shortness of breath.  Having fluid b      SECONDARY DISCHARGE DIAGNOSES  Diagnosis: Nausea & vomiting  Assessment and Plan of Treatment: Nausea / Vomiting  Nausea is the feeling that you have to vomit. As nausea gets worse, it can lead to vomiting. Vomiting puts you at an increased risk for dehydration. Older adults and people with other diseases or a weak immune system are at higher risk for dehydration. Drink clear fluids in small but frequent amounts as tolerated. Eat bland, easy-to-digest foods in small amounts as tolerated.  SEEK IMMEDIATE MEDICAL CARE IF YOU HAVE ANY OF THE FOLLOWING SYMPTOMS: fever, inability to keep sufficient fluids down, black or bloody vomitus, black or bloody stools, lightheadedness/dizziness, chest pain, severe headache, rash, shortness of breath, cold or clammy skin, confusion, pain with urination, or any signs of dehydration.

## 2024-01-08 NOTE — CHART NOTE - NSCHARTNOTEFT_GEN_A_CORE
patient seen and examined. nonverbal. d/w family at bedside. no more fevers. med ready for dc. dc time 45 min
Yes

## 2024-01-08 NOTE — DISCHARGE NOTE PROVIDER - NSDCFUSCHEDAPPT_GEN_ALL_CORE_FT
Brooks Correa Physician [facility]  NEUROLOGY 1 [facility]  Scheduled Appointment: 02/09/2024     Brooks Correa Physician [facility]  NEUROLOGY 1 [facility]  Scheduled Appointment: 02/09/2024

## 2024-01-08 NOTE — DISCHARGE NOTE PROVIDER - HOSPITAL COURSE
HPI:  Pt mother Lydia at on video phone penelope [facility] bedside, along w/family caretaker, who provide collateral as pt unable to participate in interview. Hx also obtained via chart review.    33F w/Hx of developmental delay (nonverbal at baseline), seizures (generalized, myoclonic) presents due to fever, nausea, vomiting that began yesterday. Patient was in normal state of health until she developed nasal congestion and fever yesterday. Today, began getting nauseous and vomiting and is no longer tolerating PO. Patient was admitted with similar symptoms in 9/2023 and was found to have UTI that was treated and patient able to successfully be safely discharged. Patient had one more episode of vomiting in ED despite zofran administration but otherwise no other acute complaints. Not tolerating PO at present. (05 Jan 2024 23:50)    Hospital Course:      Important Medication Changes and Reason:    Active or Pending Issues Requiring Follow-up:    Advanced Directives:   [ ] Full code  [ ] DNR  [ ] Hospice    Discharge Diagnoses:         HPI:  Pt mother Lydia at on video phone penelope [facility] bedside, along w/family caretaker, who provide collateral as pt unable to participate in interview. Hx also obtained via chart review.    33F w/Hx of developmental delay (nonverbal at baseline), seizures (generalized, myoclonic) presents due to fever, nausea, vomiting that began yesterday. Patient was in normal state of health until she developed nasal congestion and fever yesterday. Today, began getting nauseous and vomiting and is no longer tolerating PO. Patient was admitted with similar symptoms in 9/2023 and was found to have UTI that was treated and patient able to successfully be safely discharged. Patient had one more episode of vomiting in ED despite zofran administration but otherwise no other acute complaints. Not tolerating PO at present. (05 Jan 2024 23:50)    Hospital Course:  33F w/Hx of developmental delay (nonverbal at baseline), seizures (generalized, myoclonic) presents due to fever, nausea, vomiting that began yesterday likely in the setting of RSV infection.        Problem/Plan - 1:  ·  Problem: Respiratory syncytial virus (RSV).   ·  Plan: - RSV positive, procal 0.01, no concern for superimposed bacterial infection at this time  - CXR wnl  - PRN IV acetaminophen for fever  - anti-tussives as needed  - supportive care.     Problem/Plan - 2:  ·  Problem: Nausea & vomiting.   ·  Plan: - Pt unable to tolerate PO and cannot be taken care of at home as a result, no further episodes of vomiting here  - Zofran PRN  - Pantoprazole 40mg IV given x1  - Hypoglycemic this AM from not taking PO, fluids changed to D5LR while not tolerating PO, can stop once pt awake and taking PO.     Problem/Plan - 3:  ·  Problem: Seizures.   ·  Plan: - Keppra 750mg IV BID.      Important Medication Changes and Reason:    Active or Pending Issues Requiring Follow-up:    Advanced Directives:   [ ] Full code  [ ] DNR  [ ] Hospice    Discharge Diagnoses:         HPI:  Pt mother Lydia at on video phone penelope [facility] bedside, along w/family caretaker, who provide collateral as pt unable to participate in interview. Hx also obtained via chart review.    33F w/Hx of developmental delay (nonverbal at baseline), seizures (generalized, myoclonic) presents due to fever, nausea, vomiting that began yesterday. Patient was in normal state of health until she developed nasal congestion and fever yesterday. Today, began getting nauseous and vomiting and is no longer tolerating PO. Patient was admitted with similar symptoms in 9/2023 and was found to have UTI that was treated and patient able to successfully be safely discharged. Patient had one more episode of vomiting in ED despite zofran administration but otherwise no other acute complaints. Not tolerating PO at present. (05 Jan 2024 23:50)    Hospital Course:  33F w/Hx of developmental delay (nonverbal at baseline), seizures (generalized, myoclonic) presents due to fever, nausea, vomiting that began yesterday likely in the setting of RSV infection. RSV positive, procal 0.01, no concern for superimposed bacterial infection at this time. C Xr normal. Supportive care.  Pt was unable to tolerate PO and cannot be taken care of at home as a result of N/V, no further episodes of vomiting here. Was on zofran prn.  had an Hypoglycemic  from not taking PO, fluids changed to D5LR while not tolerating PO. Now patient is tolerating po medications. Clinically improved.  Dispo/Disch/Med rec MAIRA Gipson------------. DC back to home.    Important Medication Changes and Reason:    Active or Pending Issues Requiring Follow-up:    Advanced Directives:   [x ] Full code  [ ] DNR  [ ] Hospice    Discharge Diagnoses:  RSV  Nausea/vomiting         HPI:  Pt mother Lydia at on video phone penelope [facility] bedside, along w/family caretaker, who provide collateral as pt unable to participate in interview. Hx also obtained via chart review.    33F w/Hx of developmental delay (nonverbal at baseline), seizures (generalized, myoclonic) presents due to fever, nausea, vomiting that began yesterday. Patient was in normal state of health until she developed nasal congestion and fever yesterday. Today, began getting nauseous and vomiting and is no longer tolerating PO. Patient was admitted with similar symptoms in 9/2023 and was found to have UTI that was treated and patient able to successfully be safely discharged. Patient had one more episode of vomiting in ED despite zofran administration but otherwise no other acute complaints. Not tolerating PO at present. (05 Jan 2024 23:50)    Hospital Course:  33F w/Hx of developmental delay (nonverbal at baseline), seizures (generalized, myoclonic) presents due to fever, nausea, vomiting that began yesterday likely in the setting of RSV infection. RSV positive, procal 0.01, no concern for superimposed bacterial infection at this time. C Xr normal. Supportive care.  Pt was unable to tolerate PO and cannot be taken care of at home as a result of N/V, no further episodes of vomiting here. Was on zofran prn.  had an Hypoglycemic  from not taking PO, fluids changed to D5LR while not tolerating PO. Now patient is tolerating po medications. Clinically improved.  Dispo/Disch/Med rec MAIRA Gipson------------. DC back to home.    Important Medication Changes and Reason:    Active or Pending Issues Requiring Follow-up:    Advanced Directives:   [x ] Full code  [ ] DNR  [ ] Hospice    Discharge Diagnoses:  RSV  Nausea/vomiting         HPI:  Pt mother Lydia at on video phone penelope [facility] bedside, along w/family caretaker, who provide collateral as pt unable to participate in interview. Hx also obtained via chart review.    33F w/Hx of developmental delay (nonverbal at baseline), seizures (generalized, myoclonic) presents due to fever, nausea, vomiting that began yesterday. Patient was in normal state of health until she developed nasal congestion and fever yesterday. Today, began getting nauseous and vomiting and is no longer tolerating PO. Patient was admitted with similar symptoms in 9/2023 and was found to have UTI that was treated and patient able to successfully be safely discharged. Patient had one more episode of vomiting in ED despite zofran administration but otherwise no other acute complaints. Not tolerating PO at present. (05 Jan 2024 23:50)    Hospital Course:  33F w/Hx of developmental delay (nonverbal at baseline), seizures (generalized, myoclonic) presents due to fever, nausea, vomiting that began yesterday likely in the setting of RSV infection. RSV positive, procal 0.01, no concern for superimposed bacterial infection at this time. C Xr normal. Supportive care.  Pt was unable to tolerate PO and cannot be taken care of at home as a result of N/V, no further episodes of vomiting here. Was on zofran prn.  had an Hypoglycemic  from not taking PO, fluids changed to D5LR while not tolerating PO. Now patient is tolerating po medications. Clinically improved.  Dispo/Disch/Med rec DW Dr. Cox. DC back to home.    Important Medication Changes and Reason:    Active or Pending Issues Requiring Follow-up:    Advanced Directives:   [x ] Full code  [ ] DNR  [ ] Hospice    Discharge Diagnoses:  RSV  Nausea/vomiting         HPI:  Pt mother Lydia at on video phone penelope [facility] bedside, along w/family caretaker, who provide collateral as pt unable to participate in interview. Hx also obtained via chart review.    33F w/Hx of developmental delay (nonverbal at baseline), seizures (generalized, myoclonic) presents due to fever, nausea, vomiting that began yesterday. Patient was in normal state of health until she developed nasal congestion and fever yesterday. Today, began getting nauseous and vomiting and is no longer tolerating PO. Patient was admitted with similar symptoms in 9/2023 and was found to have UTI that was treated and patient able to successfully be safely discharged. Patient had one more episode of vomiting in ED despite zofran administration but otherwise no other acute complaints. Not tolerating PO at present. (05 Jan 2024 23:50)    Hospital Course:  33F w/Hx of developmental delay (nonverbal at baseline), seizures (generalized, myoclonic) presents due to fever, nausea, vomiting that began yesterday likely in the setting of RSV infection. RSV positive, procal 0.01, no concern for superimposed bacterial infection at this time. C Xr normal. Supportive care.  Pt was unable to tolerate PO and cannot be taken care of at home as a result of N/V, no further episodes of vomiting here. Was on zofran prn.  had an Hypoglycemic  from not taking PO, fluids changed to D5LR while not tolerating PO. Now patient is tolerating po medications. Clinically improved.  Dispo/Disch/Med rec DW Dr. Cox. DC back to home.    Important Medication Changes and Reason:    Active or Pending Issues Requiring Follow-up:    Advanced Directives:   [x ] Full code  [ ] DNR  [ ] Hospice    Discharge Diagnoses:  RSV  Nausea/vomiting

## 2024-01-08 NOTE — DISCHARGE NOTE NURSING/CASE MANAGEMENT/SOCIAL WORK - NSDCPEPTCAREGIVEDUMATLIST _GEN_ALL_CORE
Subjective:     CHIEF COMPLAINT  Chief Complaint   Patient presents with   • Dysuria     Urgency, painful and frequency       HPI  Taylor Patterson is a very pleasant 20 y.o. female who presents to the clinic with intermittent bouts of urinary urgency, frequency and dysuria x1 week.  Symptoms seem to come and go.  Today she has had dysuria with every bout of urination.  Denies any hematuria.  No lower abdominal pain, flank pain, fevers or chills.  She has felt intermittently nauseous however this is a long standing issue for her due to multiple drug therapy for depression and anxiety.  She denies any vaginal discharge, rashes or lesions.    REVIEW OF SYSTEMS  Review of Systems   Constitutional: Negative for chills, fever and malaise/fatigue.   Gastrointestinal: Negative for abdominal pain and diarrhea.   Genitourinary: Positive for dysuria, frequency and urgency. Negative for flank pain and hematuria.   Musculoskeletal: Negative for myalgias.   Skin: Negative for rash.   Neurological: Negative for dizziness and headaches.       PAST MEDICAL HISTORY  Patient Active Problem List    Diagnosis Date Noted   • Encounter for counseling regarding contraception 01/03/2020       SURGICAL HISTORY  patient denies any surgical history    ALLERGIES  No Known Allergies    CURRENT MEDICATIONS  Home Medications     Reviewed by Paulie Mallory P.A.-C. (Physician Assistant) on 05/05/22 at 1844  Med List Status: <None>   Medication Last Dose Status   ARIPiprazole (ABILIFY) 10 MG Tab  Active   carBAMazepine (TEGRETOL) 200 MG Tab Not Taking Active   FLUoxetine (PROZAC) 20 MG Cap Taking Active   KETAMINE HCL SL  Active   traZODone (DESYREL) 50 MG Tab Not Taking Active                SOCIAL HISTORY  Social History     Tobacco Use   • Smoking status: Never Smoker   • Smokeless tobacco: Never Used   Vaping Use   • Vaping Use: Every day   Substance and Sexual Activity   • Alcohol use: Yes   • Drug use: Yes     Frequency: 1.0 times  "per week     Types: Marijuana     Comment: Marijuana, Acid   • Sexual activity: Yes     Partners: Male     Birth control/protection: Condom       FAMILY HISTORY  Family History   Problem Relation Age of Onset   • Lupus Mother    • No Known Problems Father    • No Known Problems Brother           Objective:     VITAL SIGNS: /60   Pulse 83   Temp 36.3 °C (97.4 °F) (Temporal)   Resp 16   Ht 1.6 m (5' 3\")   Wt 90.7 kg (200 lb)   SpO2 95%   BMI 35.43 kg/m²     PHYSICAL EXAM  Physical Exam  Constitutional:       General: She is not in acute distress.     Appearance: Normal appearance. She is not ill-appearing, toxic-appearing or diaphoretic.   HENT:      Head: Normocephalic and atraumatic.   Eyes:      Conjunctiva/sclera: Conjunctivae normal.   Cardiovascular:      Rate and Rhythm: Normal rate and regular rhythm.      Pulses: Normal pulses.      Heart sounds: Normal heart sounds.   Pulmonary:      Effort: Pulmonary effort is normal.      Breath sounds: Normal breath sounds.   Abdominal:      General: Bowel sounds are normal. There is no distension.      Palpations: Abdomen is soft. There is no mass.      Tenderness: There is no abdominal tenderness. There is no right CVA tenderness, left CVA tenderness, guarding or rebound.      Hernia: No hernia is present.   Musculoskeletal:         General: Normal range of motion.      Cervical back: Normal range of motion. No muscular tenderness.   Skin:     General: Skin is warm and dry.      Capillary Refill: Capillary refill takes less than 2 seconds.   Neurological:      General: No focal deficit present.      Mental Status: She is alert and oriented to person, place, and time. Mental status is at baseline.   Psychiatric:         Mood and Affect: Mood normal.         Thought Content: Thought content normal.         Assessment/Plan:     1. Dysuria    - POCT Pregnancy  - POCT Urinalysis  - nitrofurantoin (MACROBID) 100 MG Cap; Take 1 Capsule by mouth every 12 hours for " 5 days.  Dispense: 10 Capsule; Refill: 0  - phenazopyridine (PYRIDIUM) 200 MG Tab; Take 1 Tablet by mouth 3 times a day for 2 days.  Dispense: 6 Tablet; Refill: 0      MDM/Comments:    - Pt educated on preventative measures for avoiding future UTIs  - Advised to increase fluid intake  - OTC Pyridium (Azo) for symptomatic relief, advised that it will turn urine orange in color  - Pending urine culture  - ER precautions given regarding pyelonephritis including fevers greater than 101 and, vomiting and dehydration, increased back pain.    Differential diagnosis, natural history, supportive care, and indications for immediate follow-up discussed. All questions answered. Patient agrees with the plan of care.    Follow-up as needed if symptoms worsen or fail to improve to PCP, Urgent care or Emergency Room.    I have personally reviewed prior external notes and test results pertinent to today's visit.  I have independently reviewed and interpreted all diagnostics ordered during this urgent care acute visit.   Discussed management options (risks,benefits, and alternatives to treatment). Pt expresses understanding and the treatment plan was agreed upon. Questions were encouraged and answered to pt's satisfaction.    Please note that this dictation was created using voice recognition software. I have made a reasonable attempt to correct obvious errors, but I expect that there are errors of grammar and possibly content that I did not discover before finalizing the note.   Influenza Vaccination

## 2024-01-08 NOTE — DISCHARGE NOTE NURSING/CASE MANAGEMENT/SOCIAL WORK - NSDCPEFALRISK_GEN_ALL_CORE
For information on Fall & Injury Prevention, visit: [email or web address] OR  [email or web address] OR  https://www.cdc.gov/steadi/patient.html For information on Fall & Injury Prevention, visit: [email or web address] OR  [email or web address] OR  https://www.cdc.gov/steadi/patient.html

## 2024-01-08 NOTE — DISCHARGE NOTE PROVIDER - NSDCMRMEDTOKEN_GEN_ALL_CORE_FT
famotidine 20 mg oral tablet: 1 tab(s) orally 2 times a day  Keppra 100 mg/mL oral solution: 7.5 milliliter(s) orally 2 times a day  Multiple Vitamins oral tablet: 1 tab(s) orally once a day  Restasis 0.05% ophthalmic emulsion: 1 drop(s) in each affected eye 2 times a day

## 2024-01-09 LAB
LEVETIRACETAM SERPL-MCNC: 10.3 UG/ML — SIGNIFICANT CHANGE UP (ref 10–40)
LEVETIRACETAM SERPL-MCNC: 10.3 UG/ML — SIGNIFICANT CHANGE UP (ref 10–40)

## 2024-01-12 LAB
CULTURE RESULTS: SIGNIFICANT CHANGE UP
SPECIMEN SOURCE: SIGNIFICANT CHANGE UP

## 2024-02-09 ENCOUNTER — APPOINTMENT (OUTPATIENT)
Dept: NEUROLOGY | Facility: CLINIC | Age: 34
End: 2024-02-09
Payer: MEDICARE

## 2024-02-09 VITALS
DIASTOLIC BLOOD PRESSURE: 81 MMHG | SYSTOLIC BLOOD PRESSURE: 112 MMHG | HEIGHT: 58 IN | WEIGHT: 117 LBS | BODY MASS INDEX: 24.56 KG/M2 | HEART RATE: 112 BPM

## 2024-02-09 PROCEDURE — 99214 OFFICE O/P EST MOD 30 MIN: CPT

## 2024-02-09 PROCEDURE — G2211 COMPLEX E/M VISIT ADD ON: CPT

## 2024-02-09 NOTE — HISTORY OF PRESENT ILLNESS
[Left Handed] : left handed [Divalproex (Depakote)] : divalproex (Depakote) [Levetiracetam (Keppra)] : levetiracetam (Keppra) [Oxcarbazepine (Trileptal)] : oxcarbazepine (Trileptal) [Topiramate (Topamax)] : topiramate (Topamax) [FreeTextEntry1] : Mar 24, 1994  [FreeTextEntry2] : GTCs (initially had with fever) and myoclonus; EEG has shown generalized spike-wave discharges [de-identified] : 2004

## 2024-02-09 NOTE — DISCUSSION/SUMMARY
[Well-controlled] : well-controlled [Myoclonic] : myoclonic [Generalized] : generalized  [Idiopathic] : idiopathic  [FreeTextEntry1] : Impression- - epilepsy - generalized (myoclonic) - severe mental retardation - h/o scoliosis  Plan - continue  Keppra sol'n 750 mg bid - annual lab work done by PMD - follow up in 12  months  call if any seizures  Total time 32 minutes.

## 2024-08-25 ENCOUNTER — INPATIENT (INPATIENT)
Facility: HOSPITAL | Age: 34
LOS: 1 days | Discharge: HOME | DRG: 871 | End: 2024-08-27
Attending: HOSPITALIST | Admitting: HOSPITALIST
Payer: MEDICARE

## 2024-08-25 VITALS — WEIGHT: 116.18 LBS | RESPIRATION RATE: 19 BRPM | TEMPERATURE: 99 F | HEART RATE: 110 BPM | OXYGEN SATURATION: 97 %

## 2024-08-25 DIAGNOSIS — Z98.890 OTHER SPECIFIED POSTPROCEDURAL STATES: Chronic | ICD-10-CM

## 2024-08-25 LAB
ALBUMIN SERPL ELPH-MCNC: 4.5 G/DL — SIGNIFICANT CHANGE UP (ref 3.3–5)
ALP SERPL-CCNC: 106 U/L — SIGNIFICANT CHANGE UP (ref 40–120)
ALT FLD-CCNC: 28 U/L — SIGNIFICANT CHANGE UP (ref 10–45)
ANION GAP SERPL CALC-SCNC: 18 MMOL/L — HIGH (ref 5–17)
APPEARANCE UR: CLEAR — SIGNIFICANT CHANGE UP
APTT BLD: 37.4 SEC — HIGH (ref 24.5–35.6)
AST SERPL-CCNC: 27 U/L — SIGNIFICANT CHANGE UP (ref 10–40)
BACTERIA # UR AUTO: NEGATIVE /HPF — SIGNIFICANT CHANGE UP
BASOPHILS # BLD AUTO: 0.02 K/UL — SIGNIFICANT CHANGE UP (ref 0–0.2)
BASOPHILS NFR BLD AUTO: 0.1 % — SIGNIFICANT CHANGE UP (ref 0–2)
BILIRUB SERPL-MCNC: 0.4 MG/DL — SIGNIFICANT CHANGE UP (ref 0.2–1.2)
BILIRUB UR-MCNC: NEGATIVE — SIGNIFICANT CHANGE UP
BUN SERPL-MCNC: 13 MG/DL — SIGNIFICANT CHANGE UP (ref 7–23)
CALCIUM SERPL-MCNC: 9.6 MG/DL — SIGNIFICANT CHANGE UP (ref 8.4–10.5)
CAST: 1 /LPF — SIGNIFICANT CHANGE UP (ref 0–4)
CHLORIDE SERPL-SCNC: 102 MMOL/L — SIGNIFICANT CHANGE UP (ref 96–108)
CO2 SERPL-SCNC: 24 MMOL/L — SIGNIFICANT CHANGE UP (ref 22–31)
COLOR SPEC: SIGNIFICANT CHANGE UP
CREAT SERPL-MCNC: 0.66 MG/DL — SIGNIFICANT CHANGE UP (ref 0.5–1.3)
DIFF PNL FLD: ABNORMAL
EGFR: 118 ML/MIN/1.73M2 — SIGNIFICANT CHANGE UP
EOSINOPHIL # BLD AUTO: 0 K/UL — SIGNIFICANT CHANGE UP (ref 0–0.5)
EOSINOPHIL NFR BLD AUTO: 0 % — SIGNIFICANT CHANGE UP (ref 0–6)
FLUAV AG NPH QL: SIGNIFICANT CHANGE UP
FLUBV AG NPH QL: SIGNIFICANT CHANGE UP
GAS PNL BLDV: SIGNIFICANT CHANGE UP
GLUCOSE SERPL-MCNC: 122 MG/DL — HIGH (ref 70–99)
GLUCOSE UR QL: NEGATIVE MG/DL — SIGNIFICANT CHANGE UP
HCG SERPL-ACNC: <2 MIU/ML — SIGNIFICANT CHANGE UP
HCT VFR BLD CALC: 46.8 % — HIGH (ref 34.5–45)
HGB BLD-MCNC: 15 G/DL — SIGNIFICANT CHANGE UP (ref 11.5–15.5)
IMM GRANULOCYTES NFR BLD AUTO: 0.4 % — SIGNIFICANT CHANGE UP (ref 0–0.9)
INR BLD: 1.21 RATIO — HIGH (ref 0.85–1.18)
KETONES UR-MCNC: 15 MG/DL
LEUKOCYTE ESTERASE UR-ACNC: NEGATIVE — SIGNIFICANT CHANGE UP
LYMPHOCYTES # BLD AUTO: 0.89 K/UL — LOW (ref 1–3.3)
LYMPHOCYTES # BLD AUTO: 6.4 % — LOW (ref 13–44)
MCHC RBC-ENTMCNC: 30 PG — SIGNIFICANT CHANGE UP (ref 27–34)
MCHC RBC-ENTMCNC: 32.1 GM/DL — SIGNIFICANT CHANGE UP (ref 32–36)
MCV RBC AUTO: 93.6 FL — SIGNIFICANT CHANGE UP (ref 80–100)
MONOCYTES # BLD AUTO: 0.61 K/UL — SIGNIFICANT CHANGE UP (ref 0–0.9)
MONOCYTES NFR BLD AUTO: 4.4 % — SIGNIFICANT CHANGE UP (ref 2–14)
NEUTROPHILS # BLD AUTO: 12.39 K/UL — HIGH (ref 1.8–7.4)
NEUTROPHILS NFR BLD AUTO: 88.7 % — HIGH (ref 43–77)
NITRITE UR-MCNC: NEGATIVE — SIGNIFICANT CHANGE UP
NRBC # BLD: 0 /100 WBCS — SIGNIFICANT CHANGE UP (ref 0–0)
PH UR: 5.5 — SIGNIFICANT CHANGE UP (ref 5–8)
PLATELET # BLD AUTO: 276 K/UL — SIGNIFICANT CHANGE UP (ref 150–400)
POTASSIUM SERPL-MCNC: 3.9 MMOL/L — SIGNIFICANT CHANGE UP (ref 3.5–5.3)
POTASSIUM SERPL-SCNC: 3.9 MMOL/L — SIGNIFICANT CHANGE UP (ref 3.5–5.3)
PROT SERPL-MCNC: 9.1 G/DL — HIGH (ref 6–8.3)
PROT UR-MCNC: 30 MG/DL
PROTHROM AB SERPL-ACNC: 13.2 SEC — HIGH (ref 9.5–13)
RBC # BLD: 5 M/UL — SIGNIFICANT CHANGE UP (ref 3.8–5.2)
RBC # FLD: 12.2 % — SIGNIFICANT CHANGE UP (ref 10.3–14.5)
RBC CASTS # UR COMP ASSIST: 827 /HPF — HIGH (ref 0–4)
RSV RNA NPH QL NAA+NON-PROBE: SIGNIFICANT CHANGE UP
SARS-COV-2 RNA SPEC QL NAA+PROBE: DETECTED
SODIUM SERPL-SCNC: 144 MMOL/L — SIGNIFICANT CHANGE UP (ref 135–145)
SP GR SPEC: 1.03 — SIGNIFICANT CHANGE UP (ref 1–1.03)
SQUAMOUS # UR AUTO: 1 /HPF — SIGNIFICANT CHANGE UP (ref 0–5)
UROBILINOGEN FLD QL: 1 MG/DL — SIGNIFICANT CHANGE UP (ref 0.2–1)
WBC # BLD: 13.97 K/UL — HIGH (ref 3.8–10.5)
WBC # FLD AUTO: 13.97 K/UL — HIGH (ref 3.8–10.5)
WBC UR QL: 3 /HPF — SIGNIFICANT CHANGE UP (ref 0–5)

## 2024-08-25 PROCEDURE — 99285 EMERGENCY DEPT VISIT HI MDM: CPT | Mod: GC

## 2024-08-25 PROCEDURE — 71045 X-RAY EXAM CHEST 1 VIEW: CPT | Mod: 26

## 2024-08-25 RX ORDER — LEVETIRACETAM 1000 MG/1
750 TABLET ORAL ONCE
Refills: 0 | Status: DISCONTINUED | OUTPATIENT
Start: 2024-08-25 | End: 2024-08-25

## 2024-08-25 RX ORDER — VANCOMYCIN/0.9 % SOD CHLORIDE 1.75G/25
1000 PLASTIC BAG, INJECTION (ML) INTRAVENOUS ONCE
Refills: 0 | Status: COMPLETED | OUTPATIENT
Start: 2024-08-25 | End: 2024-08-25

## 2024-08-25 RX ORDER — CEFEPIME 2 G/1
1000 INJECTION, POWDER, FOR SOLUTION INTRAVENOUS ONCE
Refills: 0 | Status: COMPLETED | OUTPATIENT
Start: 2024-08-25 | End: 2024-08-25

## 2024-08-25 RX ORDER — ONDANSETRON 2 MG/ML
4 INJECTION, SOLUTION INTRAMUSCULAR; INTRAVENOUS ONCE
Refills: 0 | Status: COMPLETED | OUTPATIENT
Start: 2024-08-25 | End: 2024-08-25

## 2024-08-25 RX ORDER — SODIUM CHLORIDE 9 MG/ML
1650 INJECTION INTRAMUSCULAR; INTRAVENOUS; SUBCUTANEOUS ONCE
Refills: 0 | Status: COMPLETED | OUTPATIENT
Start: 2024-08-25 | End: 2024-08-25

## 2024-08-25 RX ADMIN — Medication 250 MILLIGRAM(S): at 23:58

## 2024-08-25 RX ADMIN — ONDANSETRON 4 MILLIGRAM(S): 2 INJECTION, SOLUTION INTRAMUSCULAR; INTRAVENOUS at 22:20

## 2024-08-25 RX ADMIN — CEFEPIME 100 MILLIGRAM(S): 2 INJECTION, POWDER, FOR SOLUTION INTRAVENOUS at 23:30

## 2024-08-25 RX ADMIN — SODIUM CHLORIDE 1650 MILLILITER(S): 9 INJECTION INTRAMUSCULAR; INTRAVENOUS; SUBCUTANEOUS at 22:21

## 2024-08-25 RX ADMIN — LEVETIRACETAM 750 MILLIGRAM(S): 1000 TABLET ORAL at 22:20

## 2024-08-25 NOTE — ED PROVIDER NOTE - CLINICAL SUMMARY MEDICAL DECISION MAKING FREE TEXT BOX
33yF presenting with vomiting and fevers. 33yF presenting with vomiting and fevers.  Will obtain lab work to evaluate electrolytes due to the vomiting. Workup for viral etiology including flu/covid swab. Will monitor temperatures. Symptomatic relief with zofran.   Pt will most likely require admission for inability to tolerate PO.

## 2024-08-25 NOTE — ED PROVIDER NOTE - PROGRESS NOTE DETAILS
Brianda Thakur PGY-1:  axillary temperature 100.7. tylenol ordered for antipyretic. Brianda Thakur PGY-1:  spoke with pt and her aunt. patient is unable to tolerate PO.

## 2024-08-25 NOTE — ED PROVIDER NOTE - PHYSICAL EXAMINATION
General: alert and oriented  Head: normocephalic; atraumatic  Eyes: PERRLA, EOMI  ENT: no nasal flaring, patent nares  Cardio: RRR, no m/r/g, pulses 2+ b/l  Resp: CTAB, no w/r/r  GI: soft/nondistended/nontender  Neuro: normal sensation, moving all four extremities equally  Skin: No evidence of rash or bruising  MSK: normal movement of all extremities  Lymph/Vasc: no LE edema General: alert   Head: normocephalic; atraumatic  Eyes: PERRLA, EOMI  ENT: no nasal flaring, patent nares  Cardio: RRR, no m/r/g, pulses 2+ b/l  Resp: CTAB, no w/r/r  GI: soft/nondistended/nontender  Neuro: normal sensation, moving all four extremities equally  Skin: No evidence of rash or bruising  MSK: normal movement of all extremities  Lymph/Vasc: no LE edema

## 2024-08-25 NOTE — ED PROVIDER NOTE - ATTENDING CONTRIBUTION TO CARE
33 F w/ hx of MR presents to the ER w/ fever and vomiting.   Patient here with temperature of 101 last night now with nausea vomiting missed her Keppra dose today.  Patient accompanied by her aunt at bedside who is her standing guardian as her mother is away.  Patient unable to provide history this is her normal baseline mental status last seizure was "years ago".  Patient assessed has   Const: appearing stated age,   Eyes: no conjunctival injection and no scleral icterus, disconjugate gaze  ENMT: Atraumatic external nose and ears, dry mucus membranes  Neck: Symmetric, trachea midline,   CVS: +S1/S2, dorsalis pedis/radial pulse 2+ bilaterally  RESP: Unlabored respiratory effort, diminished b.l  GI: mild suprapubic tenderness Nondistended, soft abdomen  MSK: Extremities w/o deformity or ttp   Psych: Awake, Alert, & Orientedx3;  Appropriate mood and affect, cooperative  Plan for labs imaging and likely admission for fever and vomiting inability to tolerate PO 33 F w/ hx of MR presents to the ER w/ fever and vomiting.   Patient here with temperature of 101 last night now with nausea vomiting missed her Keppra dose today.  Patient accompanied by her aunt at bedside who is her standing guardian as her mother is away.  Patient unable to provide history this is her normal baseline mental status last seizure was "years ago".  Patient assessed has   Const: appearing stated age,   Eyes: no conjunctival injection and no scleral icterus, disconjugate gaze  ENMT: Atraumatic external nose and ears, dry mucus membranes  Neck: Symmetric, trachea midline,   CVS: +S1/S2, dorsalis pedis/radial pulse 2+ bilaterally  RESP: Unlabored respiratory effort, diminished b.l  GI: mild suprapubic tenderness Nondistended, soft abdomen  MSK: Extremities w/o deformity or ttp   Psych: Awake, Alert, & Orientedx0 nonverbal accompanied by aunt   Plan for labs imaging and likely admission for fever and vomiting inability to tolerate PO

## 2024-08-25 NOTE — ED ADULT NURSE NOTE - OBJECTIVE STATEMENT
pt is a 34yo female PMH seizure disorder presenting to the ED complaining of vomiting. pt mother at bedside reports pt began experiencing fevers at home last night (max temp 101F), pt has been taking tylenol to manage fevers but mother reports fevers spike once tylenol wears off. pt also began experiencing nausea with non-bloody vomiting last night, pt takes keppra for seizures, pt mother reports pt vomited following administration of PO keppra at home today, coming to ED for evaluation. pt awake, alert, mentating per baseline mentation as per mother at bedside.

## 2024-08-25 NOTE — ED PROVIDER NOTE - OBJECTIVE STATEMENT
33y F with PMH seizure disorder and cognitive delay presenting with vomiting since for 1 day. Aunt at bedside giving history as pt is nonverbal. She states the pt had a fever 101 at 10pm yesterday. She then developed vomiting around 2:30pm this afternoon. Pt is unable to tolerate PO. multiple episodes of non bloody vomitus. The aunt reports the pt has not complained of abdominal pain, chest pain, or headache. Denies diarrhea, hematochezia, dysuria, hematuria. No recent sick contacts or travel. Pt is currently on her menstrual cycle, started today 33y F with PMH seizure disorder on keppra and cognitive delay presenting with vomiting since for 1 day. Aunt at bedside giving history as pt is nonverbal. She states the pt had a fever 101 at 10pm yesterday. She then developed vomiting around 2:30pm this afternoon. Pt is unable to tolerate PO. multiple episodes of non bloody vomitus. The aunt reports the pt has not complained of abdominal pain, chest pain, or headache. Denies diarrhea, hematochezia, dysuria, hematuria. No recent sick contacts or travel. Pt is currently on her menstrual cycle, started today. aunt states she missed her keppra dose today. no abdominal surgeries 33y F with PMH seizure disorder on keppra and intellectual disability presenting with vomiting since for 1 day. Aunt at bedside giving history as pt is nonverbal. She states the pt had a fever 101 at 10pm yesterday. She then developed vomiting around 2:30pm this afternoon. Pt is unable to tolerate PO. multiple episodes of non bloody vomitus. The aunt reports the pt has not complained of abdominal pain, chest pain, or headache. Denies diarrhea, hematochezia, dysuria, hematuria. No recent sick contacts or travel. Pt is currently on her menstrual cycle, started today. aunt states she missed her keppra dose today. no abdominal surgeries

## 2024-08-25 NOTE — ED ADULT NURSE NOTE - SKIN CAPILLARY REFILL
Amos Walker's chief complaint for this visit includes:  Chief Complaint   Patient presents with     RECHECK     left leg ulcer     PCP: Racheal Swift    Referring Provider:  No referring provider defined for this encounter.    /68  Pulse 87  Temp 97.4  F (36.3  C) (Oral)  SpO2 100%  No Pain (1)     Do you need any medication refills at today's visit? no         2 seconds or less

## 2024-08-25 NOTE — ED ADULT NURSE NOTE - NSFALLUNIVINTERV_ED_ALL_ED
Bed/Stretcher in lowest position, wheels locked, appropriate side rails in place/Call bell, personal items and telephone in reach/Instruct patient to call for assistance before getting out of bed/chair/stretcher/Non-slip footwear applied when patient is off stretcher/[place] to call system/Physically safe environment - no spills, clutter or unnecessary equipment/Purposeful proactive rounding/Room/bathroom lighting operational, light cord in reach

## 2024-08-26 DIAGNOSIS — Z29.9 ENCOUNTER FOR PROPHYLACTIC MEASURES, UNSPECIFIED: ICD-10-CM

## 2024-08-26 DIAGNOSIS — K52.9 NONINFECTIVE GASTROENTERITIS AND COLITIS, UNSPECIFIED: ICD-10-CM

## 2024-08-26 DIAGNOSIS — J69.0 PNEUMONITIS DUE TO INHALATION OF FOOD AND VOMIT: ICD-10-CM

## 2024-08-26 DIAGNOSIS — R56.9 UNSPECIFIED CONVULSIONS: ICD-10-CM

## 2024-08-26 DIAGNOSIS — R11.2 NAUSEA WITH VOMITING, UNSPECIFIED: ICD-10-CM

## 2024-08-26 DIAGNOSIS — A41.9 SEPSIS, UNSPECIFIED ORGANISM: ICD-10-CM

## 2024-08-26 DIAGNOSIS — U07.1 COVID-19: ICD-10-CM

## 2024-08-26 LAB
A1C WITH ESTIMATED AVERAGE GLUCOSE RESULT: 5.5 % — SIGNIFICANT CHANGE UP (ref 4–5.6)
ADD ON TEST-SPECIMEN IN LAB: SIGNIFICANT CHANGE UP
ESTIMATED AVERAGE GLUCOSE: 111 MG/DL — SIGNIFICANT CHANGE UP (ref 68–114)
MRSA PCR RESULT.: SIGNIFICANT CHANGE UP
PROCALCITONIN SERPL-MCNC: 0.08 NG/ML — SIGNIFICANT CHANGE UP (ref 0.02–0.1)
S AUREUS DNA NOSE QL NAA+PROBE: DETECTED
S PNEUM AG UR QL: NEGATIVE — SIGNIFICANT CHANGE UP
T4 FREE SERPL-MCNC: 1.2 NG/DL — SIGNIFICANT CHANGE UP (ref 0.9–1.8)
TSH SERPL-MCNC: 0.33 UIU/ML — SIGNIFICANT CHANGE UP (ref 0.27–4.2)

## 2024-08-26 PROCEDURE — 74177 CT ABD & PELVIS W/CONTRAST: CPT | Mod: 26,MC

## 2024-08-26 PROCEDURE — 99223 1ST HOSP IP/OBS HIGH 75: CPT | Mod: GC,AI

## 2024-08-26 RX ORDER — AZTREONAM 500 MG
VIAL (EA) INJECTION
Refills: 0 | Status: DISCONTINUED | OUTPATIENT
Start: 2024-08-26 | End: 2024-08-26

## 2024-08-26 RX ORDER — ACETAMINOPHEN 325 MG/1
650 TABLET ORAL EVERY 6 HOURS
Refills: 0 | Status: DISCONTINUED | OUTPATIENT
Start: 2024-08-26 | End: 2024-08-27

## 2024-08-26 RX ORDER — MAGNESIUM, ALUMINUM HYDROXIDE 200-225/5
30 SUSPENSION, ORAL (FINAL DOSE FORM) ORAL EVERY 4 HOURS
Refills: 0 | Status: DISCONTINUED | OUTPATIENT
Start: 2024-08-26 | End: 2024-08-27

## 2024-08-26 RX ORDER — METRONIDAZOLE 250 MG
500 TABLET ORAL ONCE
Refills: 0 | Status: COMPLETED | OUTPATIENT
Start: 2024-08-26 | End: 2024-08-26

## 2024-08-26 RX ORDER — METRONIDAZOLE 250 MG
TABLET ORAL
Refills: 0 | Status: DISCONTINUED | OUTPATIENT
Start: 2024-08-26 | End: 2024-08-27

## 2024-08-26 RX ORDER — GUAIFENESIN/DEXTROMETHORPHAN 100-10MG/5
10 SYRUP ORAL EVERY 4 HOURS
Refills: 0 | Status: DISCONTINUED | OUTPATIENT
Start: 2024-08-26 | End: 2024-08-27

## 2024-08-26 RX ORDER — ENOXAPARIN SODIUM 100 MG/ML
40 INJECTION SUBCUTANEOUS EVERY 24 HOURS
Refills: 0 | Status: DISCONTINUED | OUTPATIENT
Start: 2024-08-26 | End: 2024-08-27

## 2024-08-26 RX ORDER — METRONIDAZOLE 250 MG
500 TABLET ORAL EVERY 8 HOURS
Refills: 0 | Status: DISCONTINUED | OUTPATIENT
Start: 2024-08-26 | End: 2024-08-27

## 2024-08-26 RX ORDER — AZTREONAM 500 MG
1000 VIAL (EA) INJECTION EVERY 8 HOURS
Refills: 0 | Status: DISCONTINUED | OUTPATIENT
Start: 2024-08-26 | End: 2024-08-26

## 2024-08-26 RX ORDER — LEVETIRACETAM 1000 MG/1
750 TABLET ORAL EVERY 12 HOURS
Refills: 0 | Status: DISCONTINUED | OUTPATIENT
Start: 2024-08-26 | End: 2024-08-26

## 2024-08-26 RX ORDER — ACETAMINOPHEN 325 MG/1
1000 TABLET ORAL ONCE
Refills: 0 | Status: COMPLETED | OUTPATIENT
Start: 2024-08-26 | End: 2024-08-26

## 2024-08-26 RX ORDER — LEVETIRACETAM 1000 MG/1
750 TABLET ORAL
Refills: 0 | Status: DISCONTINUED | OUTPATIENT
Start: 2024-08-26 | End: 2024-08-27

## 2024-08-26 RX ORDER — BENZONATATE 100 MG
100 CAPSULE ORAL THREE TIMES A DAY
Refills: 0 | Status: DISCONTINUED | OUTPATIENT
Start: 2024-08-26 | End: 2024-08-27

## 2024-08-26 RX ORDER — ONDANSETRON 2 MG/ML
4 INJECTION, SOLUTION INTRAMUSCULAR; INTRAVENOUS EVERY 8 HOURS
Refills: 0 | Status: DISCONTINUED | OUTPATIENT
Start: 2024-08-26 | End: 2024-08-27

## 2024-08-26 RX ORDER — AZTREONAM 500 MG
1000 VIAL (EA) INJECTION ONCE
Refills: 0 | Status: COMPLETED | OUTPATIENT
Start: 2024-08-26 | End: 2024-08-26

## 2024-08-26 RX ADMIN — ACETAMINOPHEN 400 MILLIGRAM(S): 325 TABLET ORAL at 01:15

## 2024-08-26 RX ADMIN — Medication 100 MILLIGRAM(S): at 14:21

## 2024-08-26 RX ADMIN — LEVETIRACETAM 400 MILLIGRAM(S): 1000 TABLET ORAL at 21:12

## 2024-08-26 RX ADMIN — Medication 3 MILLIGRAM(S): at 22:16

## 2024-08-26 RX ADMIN — ENOXAPARIN SODIUM 40 MILLIGRAM(S): 100 INJECTION SUBCUTANEOUS at 14:22

## 2024-08-26 RX ADMIN — Medication 100 MILLIGRAM(S): at 21:39

## 2024-08-26 RX ADMIN — Medication 50 MILLIGRAM(S): at 10:06

## 2024-08-26 RX ADMIN — Medication 500 MILLIGRAM(S): at 12:53

## 2024-08-26 RX ADMIN — LEVETIRACETAM 400 MILLIGRAM(S): 1000 TABLET ORAL at 11:31

## 2024-08-26 NOTE — H&P ADULT - PROBLEM SELECTOR PLAN 4
CTAP noted w mild ascending colon mural thickening    - can obtain GI PCR if diarrhea  - monitor for further symptoms  - abx as above

## 2024-08-26 NOTE — H&P ADULT - ASSESSMENT
33F w intellectual disability nonverbal baseline, seizure disorder on keppra, p/w fever, nausea, emesis, unable to tolerate PO, f/w COVID-19 vs asp PNA, possible colitis.

## 2024-08-26 NOTE — PATIENT PROFILE ADULT - FALL HARM RISK - FALLEN IN PAST
Spoke with dr Timothy Nieves about BP and he said to monitor them and was ok to just give one norco. I have monitor on 15 min increments and pt is stable and resting. No

## 2024-08-26 NOTE — H&P ADULT - PROBLEM SELECTOR PLAN 7
Hospital Bundle    Fluids: PO  Electrolytes: Replete K > 4, Mg > 2, Phos > 3  Nutrition: Diet Regular  PPX  ---VTE: LSQ  ---GI: n/a  ---Resp: incentive spirometry  Access: PIV  Code Status:  FULL CODE  Dispo: pending clinical improvement

## 2024-08-26 NOTE — H&P ADULT - ATTENDING COMMENTS
The patient is a 33F w h/o intellectual disability nonverbal baseline, seizure disorder on keppra p/w fever, nausea, emesis last few days.     In ED her BP is stable, Temp 100.7, tachycardic, WBC 13, CXR LLL infiltrate, suspecting aspiration. Also found to be positive Covid.    1. Sepsis due to LLL Pna, possible aspiration  2. Covid 19 infection  3. Suspected ascending colitis   4.. Seizure disorder, on Keppra  5..h/o intellectual disability nonverbal    - VSS, low grade fever noted, sepsis due to LLL PNA ( likely aspiration), colitis on CT abd and Covid 19  - c/w IV CTX 1gm daily, Flagyl 500mg tid  - Family ( Mother) refusing Covid treatment ( Remdesivir), she is not hypoxic. Ob Airborne isolation  - c/w Keppra  - aspiration precaution  - spoke to family at bedside

## 2024-08-26 NOTE — H&P ADULT - NSHPLABSRESULTS_GEN_ALL_CORE
LABS:                       15.0   13.97 )-----------( 276      ( 25 Aug 2024 22:39 )             46.8       144  |  102  |  13  ----------------------------<  122<H>  3.9   |  24  |  0.66    Ca    9.6      25 Aug 2024 22:39    TPro  9.1<H>  /  Alb  4.5  /  TBili  0.4  /  DBili  x   /  AST  27  /  ALT  28  /  AlkPhos  106      PT/INR - ( 25 Aug 2024 22:39 )   PT: 13.2 sec;   INR: 1.21 ratio    PTT - ( 25 Aug 2024 22:39 )  PTT:37.4 sec    Urinalysis Basic - ( 25 Aug 2024 22:40 )    Color: Dark Yellow / Appearance: Clear / S.027 / pH: x  Gluc: x / Ketone: 15 mg/dL  / Bili: Negative / Urobili: 1.0 mg/dL   Blood: x / Protein: 30 mg/dL / Nitrite: Negative   Leuk Esterase: Negative / RBC: 827 /HPF / WBC 3 /HPF   Sq Epi: x / Non Sq Epi: 1 /HPF / Bacteria: Negative /HPF    CAPILLARY BLOOD GLUCOSE  POCT Blood Glucose.: 109 mg/dL ( @ 22:31)    RADIOLOGY, EKG & ADDITIONAL TESTS: Reviewed.    < from: CT Abdomen and Pelvis w/ IV Cont (24 @ 00:33) >  IMPRESSION:  Mild circumferential mural thickening of the ascending colon concerning   for colitis in the appropriate clinical setting.    A cluster prominent mesenteric lymph nodes in the right lower quadrant,   similar to prior study from , potentially reactive.    Small consolidation in the left lower lung, concerning for   pneumonia/aspiration.  < end of copied text >

## 2024-08-26 NOTE — PATIENT PROFILE ADULT - FUNCTIONAL ASSESSMENT - BASIC MOBILITY 6.
4-calculated by average/Not able to assess (calculate score using [facility] averaging method)

## 2024-08-26 NOTE — H&P ADULT - PROBLEM SELECTOR PLAN 1
Pt meet SIRS w tachycardia, h/o fever, leukocytosis. Likely source Covid-19 vs colitis. Initially w elevated lactate 2.9 resolved s/p 30cc/kg IVF bolus.   s/p vanc/cef x1 (8/25)    - trend WBC, fever curve  - f/u BCx, UCx  - f/u procal  - c/w empiric abx CTX, Flagyl (colitis) for 5 day course (8/26-30) Pt meet SIRS w tachycardia, h/o fever, leukocytosis. Likely source Covid-19 vs colitis. Initially w elevated lactate 2.9 resolved s/p 30cc/kg IVF bolus.   s/p vanc/cef x1 (8/25)    - trend WBC, fever curve  - f/u BCx, UCx  - f/u procal  - c/w empiric abx CTX, Flagyl (colitis) for 5 day course (8/26-30) -- if afebrile, will switch to PO Ceftin/Flagyl to complete course at home

## 2024-08-26 NOTE — H&P ADULT - NSHPPHYSICALEXAM_GEN_ALL_CORE
T(C): 36.7 (08-26-24 @ 06:20), Max: 38.2 (08-26-24 @ 01:07)  T(F): 98 (08-26-24 @ 06:20), Max: 100.7 (08-26-24 @ 01:07)  HR: 94 (08-26-24 @ 06:20) (94 - 118)  BP: 118/82 (08-26-24 @ 06:20) (114/98 - 152/77)  BP(mean): --  RR: 18 (08-26-24 @ 06:20) (18 - 19)  SpO2: 95% (08-26-24 @ 06:20) (95% - 97%) on RA
detailed exam

## 2024-08-26 NOTE — H&P ADULT - PROBLEM SELECTOR PLAN 2
Covid+ (8/25), stable on RA. CTAP noted w LLL consolidation likely covid vs aspiration iso emesis.    - f/u infectious w/u above  - antimicrobial as above  - supportive care, incentive spirometry  - no indication for steroids at this time

## 2024-08-26 NOTE — H&P ADULT - HISTORY OF PRESENT ILLNESS
33F w intellectual disability nonverbal baseline, seizure disorder on keppra, presenting w fever, nausea, emesis, unable to tolerate PO. Aunt at bedside giving history as pt is nonverbal. She states the pt had a fever 101 at 10pm yesterday. She then developed vomiting around 2:30pm this afternoon. Pt is unable to tolerate PO. multiple episodes of non bloody vomitus. The aunt reports the pt has not complained of abdominal pain, chest pain, or headache. Denies diarrhea, hematochezia, dysuria, hematuria. No recent sick contacts or travel. Pt is currently on her menstrual cycle, started today. aunt states she missed her keppra dose today. no abdominal surgeries.    In ED, pt noted w tachycardia 110, otherwise VSS afebrile. Initial labs notable w WBC 13.97 neutrophil predominant, lactate 2.9, positive covid-19 swab. CTAP w LLL small consolidation, mild thickening of ascending colon. Pt fluid resuscitated w 1.6L IVF NS, administered vanc/cef, admitted to medicine for further care.

## 2024-08-26 NOTE — PATIENT PROFILE ADULT - FALL HARM RISK - HARM RISK INTERVENTIONS

## 2024-08-26 NOTE — H&P ADULT - PROBLEM SELECTOR PLAN 3
CTAP LLL consolidation c/f aspiration iso emesis. Stable on RA.    - abx as above  - resume diet  - S&S eval if recurrent CTAP LLL consolidation c/f aspiration iso emesis. Stable on RA.    - abx as above  - resume diet  - S&S eval if recurrent  - aspiration precautions, HOB elevation

## 2024-08-27 ENCOUNTER — TRANSCRIPTION ENCOUNTER (OUTPATIENT)
Age: 34
End: 2024-08-27

## 2024-08-27 VITALS
TEMPERATURE: 98 F | RESPIRATION RATE: 18 BRPM | HEART RATE: 82 BPM | DIASTOLIC BLOOD PRESSURE: 68 MMHG | SYSTOLIC BLOOD PRESSURE: 122 MMHG | OXYGEN SATURATION: 97 %

## 2024-08-27 LAB
CULTURE RESULTS: SIGNIFICANT CHANGE UP
HCT VFR BLD CALC: 38.3 % — SIGNIFICANT CHANGE UP (ref 34.5–45)
HGB BLD-MCNC: 12.4 G/DL — SIGNIFICANT CHANGE UP (ref 11.5–15.5)
LEGIONELLA AG UR QL: NEGATIVE — SIGNIFICANT CHANGE UP
MCHC RBC-ENTMCNC: 30.4 PG — SIGNIFICANT CHANGE UP (ref 27–34)
MCHC RBC-ENTMCNC: 32.4 GM/DL — SIGNIFICANT CHANGE UP (ref 32–36)
MCV RBC AUTO: 93.9 FL — SIGNIFICANT CHANGE UP (ref 80–100)
NRBC # BLD: 0 /100 WBCS — SIGNIFICANT CHANGE UP (ref 0–0)
PLATELET # BLD AUTO: 222 K/UL — SIGNIFICANT CHANGE UP (ref 150–400)
RBC # BLD: 4.08 M/UL — SIGNIFICANT CHANGE UP (ref 3.8–5.2)
RBC # FLD: 12.1 % — SIGNIFICANT CHANGE UP (ref 10.3–14.5)
SPECIMEN SOURCE: SIGNIFICANT CHANGE UP
WBC # BLD: 5.93 K/UL — SIGNIFICANT CHANGE UP (ref 3.8–10.5)
WBC # FLD AUTO: 5.93 K/UL — SIGNIFICANT CHANGE UP (ref 3.8–10.5)

## 2024-08-27 PROCEDURE — 99239 HOSP IP/OBS DSCHRG MGMT >30: CPT

## 2024-08-27 RX ORDER — METRONIDAZOLE 250 MG
5 TABLET ORAL
Qty: 100 | Refills: 0
Start: 2024-08-27 | End: 2024-08-30

## 2024-08-27 RX ORDER — ONDANSETRON 2 MG/ML
1 INJECTION, SOLUTION INTRAMUSCULAR; INTRAVENOUS
Qty: 6 | Refills: 0
Start: 2024-08-27 | End: 2024-09-15

## 2024-08-27 RX ORDER — METRONIDAZOLE 250 MG
1 TABLET ORAL
Qty: 8 | Refills: 0
Start: 2024-08-27 | End: 2024-08-30

## 2024-08-27 RX ORDER — CEFUROXIME SODIUM 1.5 G
1 VIAL (EA) INJECTION
Qty: 8 | Refills: 0
Start: 2024-08-27 | End: 2024-08-30

## 2024-08-27 RX ORDER — CEFDINIR 300 MG/1
10 CAPSULE ORAL
Qty: 1 | Refills: 0
Start: 2024-08-27 | End: 2024-08-30

## 2024-08-27 RX ORDER — METRONIDAZOLE 250 MG
500 TABLET ORAL
Qty: 100 | Refills: 0
Start: 2024-08-27 | End: 2024-08-30

## 2024-08-27 RX ADMIN — Medication 100 MILLIGRAM(S): at 14:07

## 2024-08-27 RX ADMIN — LEVETIRACETAM 400 MILLIGRAM(S): 1000 TABLET ORAL at 05:11

## 2024-08-27 RX ADMIN — Medication 100 MILLIGRAM(S): at 05:18

## 2024-08-27 NOTE — DISCHARGE NOTE NURSING/CASE MANAGEMENT/SOCIAL WORK - PATIENT PORTAL LINK FT
You can access the FollowMyHealth Patient Portal offered by [facility] by registering at the following website: [email or web address]. By joining [facility]’s FollowMyHealth portal, you will also be able to view your health information using other applications (apps) compatible with our system.

## 2024-08-27 NOTE — DISCHARGE NOTE NURSING/CASE MANAGEMENT/SOCIAL WORK - NSDCPEFALRISK_GEN_ALL_CORE
For information on Fall & Injury Prevention, visit: [email or web address] OR  [email or web address] OR  https://www.cdc.gov/steadi/patient.html

## 2024-08-27 NOTE — DISCHARGE NOTE PROVIDER - CARE PROVIDER_API CALL
Babs Parr  Internal Medicine  [address] [place] [id number]  Phone: [phone]  Fax: [phone]  Established Patient  Follow Up Time: 2 weeks

## 2024-08-27 NOTE — PROGRESS NOTE ADULT - PROBLEM SELECTOR PLAN 1
Pt meet SIRS w tachycardia, h/o fever, leukocytosis. Likely source Covid-19 vs colitis. Initially w elevated lactate 2.9 resolved s/p 30cc/kg IVF bolus.   s/p vanc/cef x1 (8/25)    - trend WBC, fever curve  - f/u BCx, UCx  - f/u procal  - c/w empiric abx CTX, Flagyl (colitis) for 5 day course (8/26-30) -- if afebrile, will switch to PO Ceftin/Flagyl to complete course at home Pt meet SIRS w tachycardia, h/o fever, leukocytosis. Likely source Covid-19 vs colitis. Initially w elevated lactate 2.9 resolved s/p 30cc/kg IVF bolus.   s/p vanc/cef x1 (8/25)    - trend WBC, fever curve  - c/w empiric abx CTX, Flagyl (colitis) for 5 day course (8/26-30) -- if afebrile, will switch to PO Ceftin/Flagyl to complete course at home Pt meet SIRS w tachycardia, h/o fever, leukocytosis. Likely source Covid-19 vs colitis. Initially w elevated lactate 2.9 resolved s/p 30cc/kg IVF bolus.   s/p vanc/cef x1 (8/25)    - trend WBC, fever curve  - c/w empiric abx CTX, Flagyl (colitis) for 5 day course (8/26-30), d/c on oral ampicillin/flagyl  - MRSA swab negative

## 2024-08-27 NOTE — DISCHARGE NOTE PROVIDER - NSDCCPTREATMENT_GEN_ALL_CORE_FT
PRINCIPAL PROCEDURE  Procedure: CT chest wo con  Findings and Treatment:   < end of copied text >  FINDINGS:  Evaluation limited by motion.  LOWER CHEST: Small consolidation in the left lower lobe.  LIVER: Dome is excluded from view. Otherwise, within normal limits.  BILE DUCTS: Normal caliber.  GALLBLADDER: Within normal limits.  SPLEEN: Within normal limits.  PANCREAS: Within normal limits.  ADRENALS: Within normal limits.  KIDNEYS/URETERS: Within normal limits.  BLADDER: Within normal limits.  REPRODUCTIVE ORGANS: Uterus and adnexa within normal limits.  BOWEL: No bowel obstruction. Appendix is normal. Mild mural thickening of   the ascending colon suggestive of colitis.  PERITONEUM/RETROPERITONEUM: Trace free fluid in the pelvic cul-de-sac,   likely physiologic.  VESSELS: Within normal limits.  LYMPH NODES: Clusteredmesenteric lymph nodes in the right lower   quadrant, largest of which measures up to 1.4 cm, similar to the prior   study, possibly reactive.  ABDOMINAL WALL: Within normal limits.  BONES: Within normal limits.  IMPRESSION:  Mild circumferential mural thickening of the ascending colon concerning   for colitis in the appropriate clinical setting.  A cluster prominent mesenteric lymph nodes in the right lower quadrant,   similar to prior study from 2023, potentially reactive.  Small consolidation in the left lower lung, concerning for   pneumonia/aspiration.< from: CT Abdomen and Pelvis w/ IV Cont (08.26.24 @ 00:33) >

## 2024-08-27 NOTE — PROGRESS NOTE ADULT - SUBJECTIVE AND OBJECTIVE BOX
Monalisa García  MS4  Preferred contact via [employer] Teams      Patient is a 33y old  Female who presents with a chief complaint of Fever, emesis, unable to tolerate PO (27 Aug 2024 09:05)      SUBJECTIVE / OVERNIGHT EVENTS:    MEDICATIONS  (STANDING):  cefTRIAXone   IVPB 1000 milliGRAM(s) IV Intermittent every 24 hours  enoxaparin Injectable 40 milliGRAM(s) SubCutaneous every 24 hours  levETIRAcetam  IVPB 750 milliGRAM(s) IV Intermittent two times a day  metroNIDAZOLE  IVPB      metroNIDAZOLE  IVPB 500 milliGRAM(s) IV Intermittent every 8 hours    MEDICATIONS  (PRN):  acetaminophen     Tablet .. 650 milliGRAM(s) Oral every 6 hours PRN Temp greater or equal to 38C (100.4F), Mild Pain (1 - 3)  aluminum hydroxide/magnesium hydroxide/simethicone Suspension 30 milliLiter(s) Oral every 4 hours PRN Dyspepsia  benzonatate 100 milliGRAM(s) Oral three times a day PRN Cough  guaifenesin/dextromethorphan Oral Liquid 10 milliLiter(s) Oral every 4 hours PRN Cough  melatonin 3 milliGRAM(s) Oral at bedtime PRN Insomnia  ondansetron Injectable 4 milliGRAM(s) IV Push every 8 hours PRN Nausea and/or Vomiting      CAPILLARY BLOOD GLUCOSE        I&O's Summary    26 Aug 2024 07:01  -  27 Aug 2024 07:00  --------------------------------------------------------  IN: 470 mL / OUT: 200 mL / NET: 270 mL        PHYSICAL EXAM:    Vital Signs Last 24 Hrs  T(C): 37.1 (27 Aug 2024 03:40), Max: 37.1 (27 Aug 2024 03:40)  T(F): 98.8 (27 Aug 2024 03:40), Max: 98.8 (27 Aug 2024 03:40)  HR: 85 (27 Aug 2024 03:40) (85 - 95)  BP: 121/72 (27 Aug 2024 03:40) (101/61 - 121/72)  BP(mean): --  RR: 18 (27 Aug 2024 03:40) (18 - 18)  SpO2: 97% (27 Aug 2024 03:40) (95% - 97%)    Parameters below as of 27 Aug 2024 03:40  Patient On (Oxygen Delivery Method): room air        PHYSICAL EXAM:  GENERAL: NAD, well-groomed, well-developed  HEAD:  Atraumatic, Normocephalic  EYES: EOMI, PERRLA, conjunctiva and sclera clear  ENMT: No tonsillar erythema, exudates, or enlargement; Moist mucous membranes, Good dentition  NECK: Supple, No JVD  NERVOUS SYSTEM: AOX3, motor and sensation grossly intact in b/l UE and b/l LE  PSYCHIATRIC: Appropriate affect and mood, patient is non verbal but well according to family at bedside  CHEST/LUNG: Clear to auscultation bilaterally; No rales, rhonchi, wheezing, or rubs  HEART: Regular rate and rhythm; No murmurs, rubs, or gallops. No LE edema  ABDOMEN: Soft, Nontender, Nondistended; Bowel sounds present  EXTREMITIES:  2+ Peripheral Pulses, No clubbing, cyanosis  SKIN: No rashes or lesions    LABS:                        15.0   13.97 )-----------( 276      ( 25 Aug 2024 22:39 )             46.8      08-25    144  |  102  |  13  ----------------------------<  122<H>  3.9   |  24  |  0.66    Ca    9.6      25 Aug 2024 22:39    TPro  9.1<H>  /  Alb  4.5  /  TBili  0.4  /  DBili  x   /  AST  27  /  ALT  28  /  AlkPhos  106  08-25    PT/INR - ( 25 Aug 2024 22:39 )   PT: 13.2 sec;   INR: 1.21 ratio         PTT - ( 25 Aug 2024 22:39 )  PTT:37.4 sec      Urinalysis Basic - ( 25 Aug 2024 22:40 )    Color: Dark Yellow / Appearance: Clear / S.027 / pH: x  Gluc: x / Ketone: 15 mg/dL  / Bili: Negative / Urobili: 1.0 mg/dL   Blood: x / Protein: 30 mg/dL / Nitrite: Negative   Leuk Esterase: Negative / RBC: 827 /HPF / WBC 3 /HPF   Sq Epi: x / Non Sq Epi: 1 /HPF / Bacteria: Negative /HPF        RADIOLOGY & ADDITIONAL TESTS:    Imaging Personally Reviewed:    Consultant(s) Notes Reviewed:      Care Discussed with Consultants/Other Providers:

## 2024-08-27 NOTE — DISCHARGE NOTE PROVIDER - HOSPITAL COURSE
Discharge Summary     Admit Date: 08-26-24  Discharge Date:    Admission diagnoses:   ***  Nausea and vomiting        Discharge diagnoses:   ***    Hospital Course:   For full details, please see H&P, progress notes, consult notes and ancillary notes. Briefly, GERMAIN DOS SANTOS is a 33y Female with a history of ***. The patient's hospital course will be summarized in a problem based format.    # ***    # ***    On day of discharge, patient is clinically stable with no new exam findings or acute symptoms compared to prior. The patient was seen by the attending physician on the date of discharge and deemed stable and acceptable for discharge. The patient's chronic medical conditions were treated accordingly per the patient's home medication regimen. The patient's medication reconciliation (with changes made to chronic medications), follow up appointments, discharge orders, instructions, and significant lab and diagnostic studies are as noted.     Discharge follow up action items:     1. Follow up with PCP in 1-2 weeks.   2. Follow up labs, path, & imaging ***  3. Medication changes ***  4. On hold medications ***    Patient's ordered code status: ***    Patient disposition: ***     Discharge Summary     Admit Date: 08-26-24  Discharge Date:    Admission diagnoses:   ***  Nausea and vomiting        Discharge diagnoses:   ***    Hospital Course:   For full details, please see H&P, progress notes, consult notes and ancillary notes. Briefly, GERMAIN DOS SANTOS is a 33y Female with a history of ***. The patient's hospital course will be summarized in a problem based format.    # Sepsis- pt met SIRS criteria with tachycardia, fever, and leukocytosis upon admission. Most likely due to COVID 19. Initially elevated lactate 2.9 resolved with fluid bolus. Pt given 1 dose of vanc/cef and admitted to medicine floors. Pt received CT scan of chest, positive for small left lower lobe consolidation, most likely secondary to aspiration iso vomiting. Patient continued on ceftriaxone and flagyl. Other tests including blood cultures, urinalysis, and MRSA swab all negative. Patient held for observation and started on a regular diet. Was afebrile and able to tolerate diet without nausea or vomiting and cleared for discharge with transition to oral antibiotics.     On day of discharge, patient is clinically stable with no new exam findings or acute symptoms compared to prior. The patient was seen by the attending physician on the date of discharge and deemed stable and acceptable for discharge. The patient's chronic medical conditions were treated accordingly per the patient's home medication regimen. The patient's medication reconciliation (with changes made to chronic medications), follow up appointments, discharge orders, instructions, and significant lab and diagnostic studies are as noted.     Discharge follow up action items:     1. Follow up with PCP in 1-2 weeks.   2. Follow up labs, path, & imaging ***  3. Medication changes ***  4. On hold medications ***    Patient's ordered code status: ***    Patient disposition: ***     Discharge Summary     Admit Date: 08-26-24  Discharge Date:    Admission diagnoses:   ***  Nausea and vomiting        Discharge diagnoses:   ***    Hospital Course:   For full details, please see H&P, progress notes, consult notes and ancillary notes. Briefly, GERMAIN DOS SANTOS is a 33y Female with a history of seizure disorder treated with keppra. The patient's hospital course will be summarized in a problem based format.    # Sepsis- pt met SIRS criteria with tachycardia, fever, and leukocytosis upon admission. Most likely due to COVID 19. Initially elevated lactate 2.9 resolved with fluid bolus. Pt given 1 dose of vanc/cef and admitted to medicine floors. Pt received CT scan of chest, positive for small left lower lobe consolidation, most likely secondary to aspiration iso vomiting. Patient continued on ceftriaxone and flagyl. Other tests including blood cultures, urinalysis, and MRSA swab all negative. Patient held for observation and started on a regular diet. Was afebrile and able to tolerate diet without nausea or vomiting and cleared for discharge with transition to oral antibiotics.     On day of discharge, patient is clinically stable with no new exam findings or acute symptoms compared to prior. The patient was seen by the attending physician on the date of discharge and deemed stable and acceptable for discharge. The patient's chronic medical conditions were treated accordingly per the patient's home medication regimen. The patient's medication reconciliation (with changes made to chronic medications), follow up appointments, discharge orders, instructions, and significant lab and diagnostic studies are as noted.     Discharge follow up action items:     1. Follow up with PCP in 1-2 weeks.   2. Follow up labs, path, & imaging ***  3. Medication changes ***  4. On hold medications ***    Patient's ordered code status: ***    Patient disposition: ***     Discharge Summary     Admit Date: 08-26-24  Discharge Date: 08-27-24    Admission diagnoses:   Nausea and vomiting    Discharge diagnoses:   Covid19  Ascending colitis  Left lower lobe pneumonia    Hospital Course:   For full details, please see H&P, progress notes, consult notes and ancillary notes. Briefly, GERMAIN DOS SANTOS is a 33y Female with a history of seizure disorder treated with keppra. The patient's hospital course will be summarized in a problem based format.    # Sepsis- pt met SIRS criteria with tachycardia, fever, and leukocytosis upon admission. Most likely due to COVID 19. Initially elevated lactate 2.9 resolved with fluid bolus. Pt given 1 dose of vanc/cef and admitted to medicine floors. Pt received CT scan of chest, positive for small left lower lobe consolidation, most likely secondary to aspiration iso vomiting. Patient continued on ceftriaxone and flagyl. Other tests including blood cultures, urinalysis, and MRSA swab all negative. Patient held for observation and started on a regular diet. Was afebrile and able to tolerate diet without nausea or vomiting and cleared for discharge with transition to oral antibiotics.     On day of discharge, patient is clinically stable with no new exam findings or acute symptoms compared to prior. The patient was seen by the attending physician on the date of discharge and deemed stable and acceptable for discharge. The patient's chronic medical conditions were treated accordingly per the patient's home medication regimen. The patient's medication reconciliation (with changes made to chronic medications), follow up appointments, discharge orders, instructions, and significant lab and diagnostic studies are as noted.     Discharge follow up action items:     1. Follow up with PCP in 1-2 weeks.   2. Follow up final cultures results  3. Continue antibiotics for 5 day course    Patient's ordered code status: FULL    Patient disposition: Home w CDPAP     Discharge Summary     Admit Date: 08-26-24  Discharge Date: 08-27-24    Admission diagnoses:   Nausea and vomiting    Discharge diagnoses:   Covid19  Ascending colitis  Left lower lobe pneumonia    Hospital Course:   For full details, please see H&P, progress notes, consult notes and ancillary notes.   Briefly, GERMIAN DOS SANTOS is a 33y Female with a history of seizure disorder treated with keppra. The patient's hospital course will be summarized in a problem based format.    # Sepsis- pt met SIRS criteria with tachycardia, fever, and leukocytosis upon admission. Most likely due to COVID 19. Initially elevated lactate 2.9 resolved with fluid bolus. Pt given 1 dose of vanc/cef and admitted to medicine floors. Pt received CT scan of chest, positive for small left lower lobe consolidation, most likely secondary to aspiration iso vomiting. Patient continued on ceftriaxone and flagyl. Other tests including blood cultures, urinalysis, and MRSA swab all negative. Patient held for observation and started on a regular diet. Was afebrile and able to tolerate diet without nausea or vomiting and cleared for discharge with transition to oral antibiotics.     On day of discharge, patient is clinically stable with no new exam findings or acute symptoms compared to prior. The patient was seen by the attending physician on the date of discharge and deemed stable and acceptable for discharge. The patient's chronic medical conditions were treated accordingly per the patient's home medication regimen. The patient's medication reconciliation (with changes made to chronic medications), follow up appointments, discharge orders, instructions, and significant lab and diagnostic studies are as noted.     Discharge follow up action items:     1. Follow up with PCP in 1-2 weeks.   2. Follow up final cultures results  3. Continue antibiotics for 5 day course    Patient's ordered code status: FULL    Patient disposition: Home w CDPAP

## 2024-08-27 NOTE — PROGRESS NOTE ADULT - PROBLEM SELECTOR PLAN 3
CTAP LLL consolidation c/f aspiration iso emesis. Stable on RA.    - abx as above  - resume diet  - S&S eval if recurrent  - aspiration precautions, HOB elevation

## 2024-08-27 NOTE — DISCHARGE NOTE PROVIDER - NSDCCPGOAL_GEN_ALL_CORE_FT
To get better and follow your care plan as instructed. To get better and follow your care plan as instructed.    Resume eating and drinking as able. Go slowly, remain in upright position during eating and drinking.

## 2024-08-27 NOTE — DISCHARGE NOTE PROVIDER - NSDCCPCAREPLAN_GEN_ALL_CORE_FT
PRINCIPAL DISCHARGE DIAGNOSIS  Diagnosis: Nausea & vomiting  Assessment and Plan of Treatment:      PRINCIPAL DISCHARGE DIAGNOSIS  Diagnosis: Nausea & vomiting  Assessment and Plan of Treatment: Angélica came into the hospital with nausea and vomiting. When testing for different causes she was found to be COVID-19+. This is likely the reason for her illness. She also had a lung scan which showed a small pneumonia-likee abnormality in the left lung that could either be due to COVID or due to aspiration during vomiting. She was given IV antibiotics for this while in the hospital. After a day of medicine and observation, Angélica appeared to regain her normal state of health, her fever came down, and her white blood cells which represent infection normalized. She has been transitioned to oral versions of her antibiotics and she should continue taking them for the entirety of their course, even if she already feels better. Her blood and urine were also tested for infection and were negative. Please resume her normal diet as tolerated, get plenty of fluids, and try to sit up and maintain upright position during/after eating and drinking.   Please follow up with her primary care provider in 1-2 weeks.

## 2024-08-27 NOTE — PROGRESS NOTE ADULT - ATTENDING COMMENTS
The patient is a 33F w h/o intellectual disability nonverbal baseline, seizure disorder on keppra p/w fever, nausea, emesis last few days.     In ED her BP is stable, Temp 100.7, tachycardic, WBC 13, CXR LLL infiltrate, suspecting aspiration. Also found to be positive Covid.    1. Sepsis due to LLL Pna, possible aspiration  2. Covid 19 infection  3. Suspected ascending colitis   4.. Seizure disorder, on Keppra  5..h/o intellectual disability nonverbal    - VSS, low grade fever noted, sepsis due to LLL PNA ( likely aspiration), colitis on CT abd and Covid 19  - c/w IV CTX 1gm daily, Flagyl 500mg tid  - Family ( Mother) refusing Covid treatment ( Remdesivir), she is not hypoxic. Ob Airborne isolation  - c/w Keppra  - aspiration precaution  - spoke to family at bedside The patient is a 33F w h/o intellectual disability nonverbal baseline, seizure disorder on keppra p/w fever, nausea, emesis last few days.     In ED her BP is stable, Temp 100.7, tachycardic, WBC 13, CXR LLL infiltrate, suspecting aspiration. Also found to be positive Covid.    1. Sepsis due to LLL Pna, possible aspiration  2. Covid 19 infection  3. Suspected ascending colitis   4.. Seizure disorder, on Keppra  5. h/o intellectual disability nonverbal    - Overall improved, no N/V, more interactive, afebrile, not hypoxic. family wants to take her miri at this point  - has been on c/w IV CTX 1gm daily, Flagyl 500mg tid, blood c/s neg  - will d/c her on Augmentin 875mg bid for 5 days total, outpatient GI f/u for CT abd findings  - Family ( Mother) refusing Covid treatment- ( Remdesivir), she is not hypoxic. On Airborne isolation  - c/w Keppra  - aspiration precaution  - d/c home today, outpatient f/u with PCP, GI  - Team spoke to family at bedside  ** Time spent 44 min excluding house staff teaching

## 2024-08-27 NOTE — DISCHARGE NOTE PROVIDER - NSDCMRMEDTOKEN_GEN_ALL_CORE_FT
famotidine 20 mg oral tablet: 1 tab(s) orally 2 times a day  Keppra 100 mg/mL oral solution: 7.5 milliliter(s) orally 2 times a day  Multiple Vitamins oral tablet: 1 tab(s) orally once a day  Restasis 0.05% ophthalmic emulsion: 1 drop(s) in each affected eye 2 times a day   cefuroxime 500 mg oral tablet: 1 tab(s) orally 2 times a day  Keppra 100 mg/mL oral solution: 7.5 milliliter(s) orally 2 times a day  metroNIDAZOLE 500 mg oral tablet: 1 tab(s) orally 2 times a day  Multiple Vitamins oral tablet: 1 tab(s) orally once a day  Restasis 0.05% ophthalmic emulsion: 1 drop(s) in each affected eye 2 times a day

## 2024-08-31 LAB
CULTURE RESULTS: SIGNIFICANT CHANGE UP
CULTURE RESULTS: SIGNIFICANT CHANGE UP
SPECIMEN SOURCE: SIGNIFICANT CHANGE UP
SPECIMEN SOURCE: SIGNIFICANT CHANGE UP

## 2024-09-29 PROCEDURE — 84702 CHORIONIC GONADOTROPIN TEST: CPT

## 2024-09-29 PROCEDURE — 83036 HEMOGLOBIN GLYCOSYLATED A1C: CPT

## 2024-09-29 PROCEDURE — 81001 URINALYSIS AUTO W/SCOPE: CPT

## 2024-09-29 PROCEDURE — 85018 HEMOGLOBIN: CPT

## 2024-09-29 PROCEDURE — 85025 COMPLETE CBC W/AUTO DIFF WBC: CPT

## 2024-09-29 PROCEDURE — 84443 ASSAY THYROID STIM HORMONE: CPT

## 2024-09-29 PROCEDURE — 85730 THROMBOPLASTIN TIME PARTIAL: CPT

## 2024-09-29 PROCEDURE — 82330 ASSAY OF CALCIUM: CPT

## 2024-09-29 PROCEDURE — 87637 SARSCOV2&INF A&B&RSV AMP PRB: CPT

## 2024-09-29 PROCEDURE — 87640 STAPH A DNA AMP PROBE: CPT

## 2024-09-29 PROCEDURE — 71045 X-RAY EXAM CHEST 1 VIEW: CPT

## 2024-09-29 PROCEDURE — 82947 ASSAY GLUCOSE BLOOD QUANT: CPT

## 2024-09-29 PROCEDURE — 87641 MR-STAPH DNA AMP PROBE: CPT

## 2024-09-29 PROCEDURE — 80053 COMPREHEN METABOLIC PANEL: CPT

## 2024-09-29 PROCEDURE — 85027 COMPLETE CBC AUTOMATED: CPT

## 2024-09-29 PROCEDURE — 84295 ASSAY OF SERUM SODIUM: CPT

## 2024-09-29 PROCEDURE — 74177 CT ABD & PELVIS W/CONTRAST: CPT | Mod: MC

## 2024-09-29 PROCEDURE — 84439 ASSAY OF FREE THYROXINE: CPT

## 2024-09-29 PROCEDURE — 85014 HEMATOCRIT: CPT

## 2024-09-29 PROCEDURE — 87040 BLOOD CULTURE FOR BACTERIA: CPT

## 2024-09-29 PROCEDURE — 85610 PROTHROMBIN TIME: CPT

## 2024-09-29 PROCEDURE — 83605 ASSAY OF LACTIC ACID: CPT

## 2024-09-29 PROCEDURE — 82962 GLUCOSE BLOOD TEST: CPT

## 2024-09-29 PROCEDURE — 82803 BLOOD GASES ANY COMBINATION: CPT

## 2024-09-29 PROCEDURE — 96375 TX/PRO/DX INJ NEW DRUG ADDON: CPT

## 2024-09-29 PROCEDURE — 87086 URINE CULTURE/COLONY COUNT: CPT

## 2024-09-29 PROCEDURE — 84145 PROCALCITONIN (PCT): CPT

## 2024-09-29 PROCEDURE — 84132 ASSAY OF SERUM POTASSIUM: CPT

## 2024-09-29 PROCEDURE — 87899 AGENT NOS ASSAY W/OPTIC: CPT

## 2024-09-29 PROCEDURE — 99285 EMERGENCY DEPT VISIT HI MDM: CPT

## 2024-09-29 PROCEDURE — 82435 ASSAY OF BLOOD CHLORIDE: CPT

## 2024-09-29 PROCEDURE — 87449 NOS EACH ORGANISM AG IA: CPT

## 2024-09-29 PROCEDURE — 96374 THER/PROPH/DIAG INJ IV PUSH: CPT

## 2025-01-17 ENCOUNTER — APPOINTMENT (OUTPATIENT)
Dept: NEUROLOGY | Facility: CLINIC | Age: 35
End: 2025-01-17
Payer: MEDICARE

## 2025-01-17 VITALS
HEART RATE: 95 BPM | SYSTOLIC BLOOD PRESSURE: 110 MMHG | WEIGHT: 116 LBS | BODY MASS INDEX: 24.35 KG/M2 | HEIGHT: 58 IN | DIASTOLIC BLOOD PRESSURE: 74 MMHG

## 2025-01-17 PROCEDURE — 99214 OFFICE O/P EST MOD 30 MIN: CPT

## 2025-01-17 PROCEDURE — G2211 COMPLEX E/M VISIT ADD ON: CPT

## 2025-06-24 ENCOUNTER — NON-APPOINTMENT (OUTPATIENT)
Age: 35
End: 2025-06-24

## 2025-06-25 ENCOUNTER — NON-APPOINTMENT (OUTPATIENT)
Age: 35
End: 2025-06-25

## 2025-06-25 ENCOUNTER — APPOINTMENT (OUTPATIENT)
Dept: OPHTHALMOLOGY | Facility: CLINIC | Age: 35
End: 2025-06-25
Payer: MEDICARE

## 2025-06-25 PROCEDURE — 92002 INTRM OPH EXAM NEW PATIENT: CPT
